# Patient Record
Sex: MALE | Race: BLACK OR AFRICAN AMERICAN | Employment: OTHER | URBAN - METROPOLITAN AREA
[De-identification: names, ages, dates, MRNs, and addresses within clinical notes are randomized per-mention and may not be internally consistent; named-entity substitution may affect disease eponyms.]

---

## 2019-11-22 ENCOUNTER — HOSPITAL ENCOUNTER (EMERGENCY)
Age: 69
Discharge: HOME | End: 2019-11-22
Attending: EMERGENCY MEDICINE
Payer: MEDICARE

## 2019-11-22 VITALS
OXYGEN SATURATION: 97 % | HEART RATE: 92 BPM | TEMPERATURE: 99 F | SYSTOLIC BLOOD PRESSURE: 129 MMHG | RESPIRATION RATE: 20 BRPM | DIASTOLIC BLOOD PRESSURE: 73 MMHG

## 2019-11-22 DIAGNOSIS — R09.81 NASAL CONGESTION: ICD-10-CM

## 2019-11-22 DIAGNOSIS — R09.82 POSTNASAL DRIP: Primary | ICD-10-CM

## 2019-11-22 PROCEDURE — 99282 EMERGENCY DEPT VISIT SF MDM: CPT

## 2019-11-22 RX ORDER — SILDENAFIL 100 MG/1
100 TABLET, FILM COATED ORAL AS NEEDED
COMMUNITY

## 2019-11-22 RX ORDER — AZELASTINE 1 MG/ML
1 SPRAY, METERED NASAL 2 TIMES DAILY
Qty: 1 BOTTLE | Refills: 0 | Status: SHIPPED | OUTPATIENT
Start: 2019-11-22

## 2019-11-22 RX ORDER — OMEPRAZOLE 20 MG/1
20 CAPSULE, DELAYED RELEASE ORAL DAILY
COMMUNITY

## 2019-11-22 RX ORDER — TAMSULOSIN HYDROCHLORIDE 0.4 MG/1
0.4 CAPSULE ORAL DAILY
COMMUNITY

## 2019-11-23 NOTE — ED PROVIDER NOTES
EMERGENCY DEPARTMENT HISTORY AND PHYSICAL EXAM    Date: 11/22/2019  Patient Name: Genaro Clemons    History of Presenting Illness     Chief Complaint   Patient presents with    Nasal Congestion    Cough       HPI: Genaro Clemons, 68 y.o. male presents to the ED c/o nasal congestion and postnasal drip been going on \"for a while\" that he attributes to using his CPAP machine, which causes him to cough in the morning. He denies fevers, chills, myalgias, fatigue. He is able to eat and drink normally. States he does not regularly use his CPAP machine because the face mask does not fit properly. States his wife is here being seen so he decided to be evaluated as well. There are no other complaints, changes, or physical findings at this time. Past History     Past Medical History:  Past Medical History:   Diagnosis Date    Diabetes ([facility] [address]     History of cholecystectomy     Hypertension     Schizoaffective disorder ([facility] [address]        Past Surgical History:  History reviewed. No pertinent surgical history. Family History:  Family History   Problem Relation Age of Onset    Stroke Father     Heart Disease Brother     Cancer Other     Diabetes Other     Hypertension Other        Social History:  Social History     Tobacco Use    Smoking status: Never Smoker   Substance Use Topics    Alcohol use: No    Drug use: No       Allergies: Allergies   Allergen Reactions    Lisinopril Runny Nose    Norco [Hydrocodone-Acetaminophen] Unknown (comments)    Sulfa (Sulfonamide Antibiotics) Other (comments)         Review of Systems   Constitutional: Negative for chills and fever. HENT: Positive for congestion and postnasal drip. Negative for ear discharge, ear pain, rhinorrhea, sore throat and trouble swallowing. Respiratory: Positive for cough. Negative for shortness of breath and wheezing. Cardiovascular: Negative for chest pain and palpitations.    Gastrointestinal: Negative for abdominal pain, nausea and vomiting. Musculoskeletal: Negative for back pain, myalgias and neck pain. Skin: Negative. Allergic/Immunologic: Negative. Neurological: Negative for headaches. Psychiatric/Behavioral: Negative. Physical Exam  Vitals signs and nursing note reviewed. Constitutional:       General: He is not in acute distress. Appearance: Normal appearance. He is well-developed. He is not toxic-appearing. HENT:      Head: Normocephalic and atraumatic. Right Ear: External ear normal.      Left Ear: External ear normal.      Nose: Congestion present. Right Turbinates: Enlarged. Left Turbinates: Enlarged. Right Sinus: No maxillary sinus tenderness or frontal sinus tenderness. Left Sinus: No maxillary sinus tenderness or frontal sinus tenderness. Mouth/Throat:      Mouth: Mucous membranes are moist.      Pharynx: Oropharynx is clear. Uvula midline. Eyes:      General: Lids are normal. No scleral icterus. Conjunctiva/sclera: Conjunctivae normal.   Neck:      Musculoskeletal: Normal range of motion and neck supple. Pulmonary:      Effort: Pulmonary effort is normal. No respiratory distress. Breath sounds: Normal breath sounds and air entry. Abdominal:      Tenderness: There is no tenderness. Musculoskeletal: Normal range of motion. Lymphadenopathy:      Cervical: No cervical adenopathy. Skin:     General: Skin is warm. Findings: No rash. Neurological:      Mental Status: He is alert and oriented to person, place, and time. Motor: No abnormal muscle tone. Psychiatric:         Speech: Speech normal.         Behavior: Behavior normal. Behavior is cooperative. Thought Content: Thought content normal.         Judgment: Judgment normal.          Diagnostic Study Results     Labs -   No results found for this or any previous visit (from the past 12 hour(s)).     Radiologic Studies -   No orders to display     CT Results  (Last 48 hours)    None CXR Results  (Last 48 hours)    None          Vital Signs-Reviewed the patient's vital signs. Patient Vitals for the past 12 hrs:   Temp Pulse Resp BP SpO2   11/1950 99 °F (37.2 °C) 92 20 129/73 97 %       I reviewed the vital signs, available nursing notes, past medical history, past surgical history, family history and social history. Provider Notes (Medical Decision Making):   Postnasal drip with throat irritation causing cough chronically. Throat normal appearance, pt afebrile, normal breath sounds. Treat with Astelin nasal spray. Encourage humidifier machine at home. Pt agrees with plan. Diagnosis     Clinical Impression:   1. Postnasal drip    2. Nasal congestion        Disposition:  Home    PLAN:  1. Current Discharge Medication List      START taking these medications    Details   azelastine (ASTELIN) 137 mcg (0.1 %) nasal spray 1 [place] by Both Nostrils route two (2) times a day. Use in each nostril as directed  Qty: 1 Bottle, Refills: 0           2. Follow-up Information     Follow up With Specialties Details Why [facility] EMERGENCY DEPT Emergency Medicine  If symptoms worsen, As needed [address] Julianne Palm MD Internal Medicine Schedule an appointment as soon as possible for a visit in 2 days for re-evaluation [address] [facility]  [phone]          3.  Return to ED if worse       Silke Archer PA-C

## 2021-08-10 ENCOUNTER — APPOINTMENT (OUTPATIENT)
Dept: GENERAL RADIOLOGY | Age: 71
End: 2021-08-10
Attending: PHYSICIAN ASSISTANT
Payer: MEDICARE

## 2021-08-10 ENCOUNTER — HOSPITAL ENCOUNTER (EMERGENCY)
Age: 71
Discharge: HOME | End: 2021-08-10
Attending: EMERGENCY MEDICINE
Payer: MEDICARE

## 2021-08-10 VITALS
HEART RATE: 85 BPM | BODY MASS INDEX: 30.1 KG/M2 | OXYGEN SATURATION: 96 % | DIASTOLIC BLOOD PRESSURE: 77 MMHG | HEIGHT: 71 IN | SYSTOLIC BLOOD PRESSURE: 137 MMHG | WEIGHT: 215 LBS | RESPIRATION RATE: 18 BRPM | TEMPERATURE: 98.6 F

## 2021-08-10 DIAGNOSIS — M19.011 GLENOHUMERAL ARTHRITIS, RIGHT: Primary | ICD-10-CM

## 2021-08-10 DIAGNOSIS — M75.81 BONE SPUR OF ACROMIOCLAVICULAR JOINT, RIGHT: ICD-10-CM

## 2021-08-10 DIAGNOSIS — B37.2 YEAST DERMATITIS: ICD-10-CM

## 2021-08-10 LAB — GLUCOSE BLD STRIP.AUTO-MCNC: 152 MG/DL (ref 70–110)

## 2021-08-10 PROCEDURE — 73030 X-RAY EXAM OF SHOULDER: CPT

## 2021-08-10 PROCEDURE — 99283 EMERGENCY DEPT VISIT LOW MDM: CPT

## 2021-08-10 PROCEDURE — 82962 GLUCOSE BLOOD TEST: CPT

## 2021-08-10 RX ORDER — DICLOFENAC SODIUM 10 MG/G
2 GEL TOPICAL 4 TIMES DAILY
Qty: 100 G | Refills: 0 | Status: SHIPPED | OUTPATIENT
Start: 2021-08-10

## 2021-08-10 RX ORDER — NYSTATIN 100000 [USP'U]/G
POWDER TOPICAL 4 TIMES DAILY
Qty: 30 G | Refills: 0 | Status: SHIPPED | OUTPATIENT
Start: 2021-08-10

## 2021-08-10 NOTE — ED TRIAGE NOTES
Pt reports skin rash under right axilla for months, states she has tried hydrocortisone and it has been unsuccessful. He also reports right shoulder pain while driving. Denies recent injury to shoulder.

## 2021-08-10 NOTE — ROUTINE PROCESS
Arron Weir is a 65 y.o. male that was discharged in stable. Pt was accompanied by self. Pt is not driving. The patients diagnosis, condition and treatment were explained to  patient and aftercare instructions were given. The patient verbalized understanding. Patient armband removed and shredded.

## 2021-09-14 NOTE — ED PROVIDER NOTES
EMERGENCY DEPARTMENT HISTORY AND PHYSICAL EXAM      Date: (Not on file)  Patient Name: Young Landau    History of Presenting Illness     Chief Complaint   Patient presents with    Skin Problem    Shoulder Pain       History Provided By: Patient    HPI: Young Landau, 66 y.o. male PMHx significant for DM, schizoaffective ds, htn presents ambulatory to the ED. Pt reports pruritic rash to right axilla x \"months\". Denies new lotion, soap, detergent. Denies history of eczema. Patient has been using hydrocortisone without relief of symptoms. Pt also reports right shoulder pain x \"months\". Patient reports symptoms are worse when driving. Denies numbness/tingling, radiating pain, weakness. Denies recent trauma or injury. Patient is been taking OTC medication without relief of symptoms. Patient has not followed up with Ortho. There are no other complaints, changes, or physical findings at this time. PCP: Nikunj, MD Juan    No current facility-administered medications on file prior to encounter. Current Outpatient Medications on File Prior to Encounter   Medication Sig Dispense Refill    tamsulosin (FLOMAX) 0.4 mg capsule Take 0.4 mg by mouth daily.  omeprazole (PRILOSEC) 20 mg capsule Take 20 mg by mouth daily.  sildenafil citrate (VIAGRA) 100 mg tablet Take 100 mg by mouth as needed.  acetaminophen (TYLENOL) 325 mg tablet Take 2 Tabs by mouth every four (4) hours as needed for Pain. 30 Tab 0    sertraline (ZOLOFT) 100 mg tablet Take 100 mg by mouth daily.  glipiZIDE (GLUCOTROL) 5 mg tablet Take 5 mg by mouth two (2) times a day.  aspirin 81 mg tablet Take 81 mg by mouth.  metFORMIN (GLUCOPHAGE) 500 mg tablet Take 500 mg by mouth two (2) times daily (with meals).  risperiDONE (RISPERDAL) 3 mg tablet Take 6 mg by mouth daily.  carBAMazepine XR (TEGRETOL XR) 400 mg SR tablet Take 800 mg by mouth two (2) times a day.       buPROPion SR (WELLBUTRIN SR) 150 mg SR tablet Take 150 mg by mouth two (2) times a day.  docusate sodium (COLACE) 100 mg capsule Take 200 mg by mouth daily.  amLODIPine (NORVASC) 10 mg tablet Take 5 mg by mouth daily.  atorvastatin (LIPITOR) 20 mg tablet Take 20 mg by mouth daily.  azelastine (ASTELIN) 137 mcg (0.1 %) nasal spray 1 [place] by Both Nostrils route two (2) times a day. Use in each nostril as directed (Patient not taking: Reported on 8/10/2021) 1 Bottle 0    oxybutynin (DITROPAN) 5 mg tablet Take 5 mg by mouth two (2) times a day. (Patient not taking: Reported on 8/10/2021)         Past History     Past Medical History:  Past Medical History:   Diagnosis Date    Diabetes ([facility] [address]     History of cholecystectomy     Hypertension     Schizoaffective disorder ([facility] [address]        Past Surgical History:  No past surgical history on file. Family History:  Family History   Problem Relation Age of Onset    Stroke Father     Heart Disease Brother     Cancer Other     Diabetes Other     Hypertension Other        Social History:  Social History     Tobacco Use    Smoking status: Never Smoker   Substance Use Topics    Alcohol use: No    Drug use: No       Allergies: Allergies   Allergen Reactions    Lisinopril Runny Nose    Norco [Hydrocodone-Acetaminophen] Unknown (comments)    Sulfa (Sulfonamide Antibiotics) Other (comments)         Review of Systems   Review of Systems   Constitutional: Negative for chills and fever. HENT: Negative for facial swelling. Eyes: Negative for photophobia and visual disturbance. Respiratory: Negative for shortness of breath. Cardiovascular: Negative for chest pain. Gastrointestinal: Negative for abdominal pain, nausea and vomiting. Genitourinary: Negative for flank pain. Musculoskeletal: Positive for arthralgias (right shoulder pain). Skin: Positive for rash. Negative for color change, pallor and wound.    Neurological: Negative for dizziness, weakness, light-headedness and headaches. All other systems reviewed and are negative. Physical Exam   Physical Exam  Vitals and nursing note reviewed. Constitutional:       General: He is not in acute distress. Appearance: He is well-developed. Comments: Pt in NAD   HENT:      Head: Normocephalic and atraumatic. Eyes:      Conjunctiva/sclera: Conjunctivae normal.   Cardiovascular:      Rate and Rhythm: Normal rate and regular rhythm. Heart sounds: Normal heart sounds. Pulmonary:      Effort: Pulmonary effort is normal. No respiratory distress. Breath sounds: Normal breath sounds. Abdominal:      General: Bowel sounds are normal. There is no distension. Palpations: Abdomen is soft. Musculoskeletal:         General: Normal range of motion. Right shoulder: Tenderness and bony tenderness present. Normal range of motion (full AROM intact). Comments: Radial pulses strong and equal bilaterally  Sensation equal and intact to lower extremities bilaterally  Strength 5/5 to lower extremities bilaterally   Skin:     General: Skin is warm. Findings: No rash. Comments: Right axilla: Erythematous macular rash. No papules or vesicles. No induration, fluctuance, drainage or warmth   Neurological:      Mental Status: He is alert and oriented to person, place, and time. Psychiatric:         Behavior: Behavior normal.         Diagnostic Study Results     Labs -   No results found for this or any previous visit (from the past 12 hour(s)). Radiologic Studies -   XR SHOULDER RT AP/LAT MIN 2 V   Final Result   1. Mild glenohumeral osteoarthritis. 2.  Small subacromial spur. CT Results  (Last 48 hours)    None        CXR Results  (Last 48 hours)    None          Medical Decision Making   I am the first provider for this patient. I reviewed the vital signs, available nursing notes, past medical history, past surgical history, family history and social history.     Vital Signs-Reviewed the patient's vital signs. No data found. Records Reviewed: Nursing Notes and Old Medical Records    Provider Notes (Medical Decision Making):   DDx: Shoulder fracture vs arthritis vs sprain, Yeast dermatitis, Dermatitis, Tinea corporis    67 yo M who presents with rash to right axilla x \"months\" and right shoulder pain x \"months\". On exam, erythematous macular rash. Appears to be yeast dermatitis. Will treat with nystatin. Also right shoulder tenderness. NVI. No overlying erythema or warmth. Xray show arthritis and glenohumeral spur. Will treat patient symptomatically and discussed need for Ortho follow-up. At time of discharge, pt non-toxic appearing in NAD. Pt stable for prompt outpatient follow-up with PCP 1 to 2 days. Patient given strict instructions to return if symptoms worsen. ED Course:   Initial assessment performed. The patients presenting problems have been discussed, and they are in agreement with the care plan formulated and outlined with them. I have encouraged them to ask questions as they arise throughout their visit. Disposition:  Discussed imaging results with pt along with dx and treatment plan. Discussed importance of PCP follow up. All questions answered. Pt voiced they understood. Return if sx worsen. PLAN:  1. Discharge Medication List as of 8/10/2021  2:53 PM      START taking these medications    Details   diclofenac (Voltaren Arthritis Pain) 1 % gel Apply 2 g to affected area four (4) times daily. , Normal, Disp-100 g, R-0      nystatin (MYCOSTATIN) powder Apply  to affected area four (4) times daily. , Normal, Disp-30 g, R-0         CONTINUE these medications which have NOT CHANGED    Details   tamsulosin (FLOMAX) 0.4 mg capsule Take 0.4 mg by mouth daily. , Historical Med      omeprazole (PRILOSEC) 20 mg capsule Take 20 mg by mouth daily. , Historical Med      sildenafil citrate (VIAGRA) 100 mg tablet Take 100 mg by mouth as needed., Historical Med acetaminophen (TYLENOL) 325 mg tablet Take 2 Tabs by mouth every four (4) hours as needed for Pain., Print, Disp-30 Tab, R-0      sertraline (ZOLOFT) 100 mg tablet Take 100 mg by mouth daily. , Historical Med      glipiZIDE (GLUCOTROL) 5 mg tablet Take 5 mg by mouth two (2) times a day., Historical Med      aspirin 81 mg tablet Take 81 mg by mouth., Historical Med      metFORMIN (GLUCOPHAGE) 500 mg tablet Take 500 mg by mouth two (2) times daily (with meals). , Historical Med      risperiDONE (RISPERDAL) 3 mg tablet Take 6 mg by mouth daily. , Historical Med      carBAMazepine XR (TEGRETOL XR) 400 mg SR tablet Take 800 mg by mouth two (2) times a day., Historical Med      buPROPion SR (WELLBUTRIN SR) 150 mg SR tablet Take 150 mg by mouth two (2) times a day., Historical Med      docusate sodium (COLACE) 100 mg capsule Take 200 mg by mouth daily. , Historical Med      amLODIPine (NORVASC) 10 mg tablet Take 5 mg by mouth daily. , Historical Med      atorvastatin (LIPITOR) 20 mg tablet Take 20 mg by mouth daily. , Historical Med      azelastine (ASTELIN) 137 mcg (0.1 %) nasal spray 1 [place] by Both Nostrils route two (2) times a day. Use in each nostril as directed, Print, Disp-1 Bottle, R-0      oxybutynin (DITROPAN) 5 mg tablet Take 5 mg by mouth two (2) times a day., Historical Med           2. Follow-up Information     Follow up With Specialties Details Why [address]  Schedule an appointment as soon as possible for a visit in 1 day  [address]  Schedule an appointment as soon as possible for a visit in 1 day  [address] [address] [place] [id number]  [phone] [address] [facility] EMERGENCY DEPT Emergency Medicine  As needed, If symptoms worsen [address] [facility]  [phone]        Return to ED if worse     Diagnosis     Clinical Impression:   1.  Glenohumeral arthritis, right    2. Bone spur of acromioclavicular joint, right    3. Yeast dermatitis        Attestations:    DAVID Zhu    Please note that this dictation was completed with [employer], the computer voice recognition software. Quite often unanticipated grammatical, syntax, homophones, and other interpretive errors are inadvertently transcribed by the computer software. Please disregard these errors. Please excuse any errors that have escaped final proofreading. Thank you.

## 2023-09-21 ENCOUNTER — HOSPITAL ENCOUNTER (EMERGENCY)
Facility: HOSPITAL | Age: 73
Discharge: HOME | End: 2023-09-21
Payer: MEDICARE

## 2023-09-21 ENCOUNTER — APPOINTMENT (OUTPATIENT)
Facility: HOSPITAL | Age: 73
End: 2023-09-21
Payer: MEDICARE

## 2023-09-21 VITALS
RESPIRATION RATE: 16 BRPM | BODY MASS INDEX: 30.06 KG/M2 | HEART RATE: 78 BPM | TEMPERATURE: 97.1 F | DIASTOLIC BLOOD PRESSURE: 66 MMHG | HEIGHT: 70 IN | OXYGEN SATURATION: 97 % | WEIGHT: 210 LBS | SYSTOLIC BLOOD PRESSURE: 138 MMHG
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
## 2023-09-21 DIAGNOSIS — S86.911A STRAIN OF RIGHT KNEE, INITIAL ENCOUNTER: ICD-10-CM

## 2023-09-21 PROCEDURE — 73562 X-RAY EXAM OF KNEE 3: CPT

## 2023-09-21 PROCEDURE — 99283 EMERGENCY DEPT VISIT LOW MDM: CPT

## 2023-09-21 RX ORDER — NAPROXEN 500 MG/1
500 TABLET ORAL 2 TIMES DAILY PRN
Qty: 30 TABLET | Refills: 0 | Status: SHIPPED | OUTPATIENT
Start: 2023-09-21

## 2023-09-21 ASSESSMENT — PAIN DESCRIPTION - ORIENTATION: ORIENTATION: RIGHT

## 2023-09-21 ASSESSMENT — PAIN SCALES - GENERAL: PAINLEVEL_OUTOF10: 8

## 2023-09-21 ASSESSMENT — LIFESTYLE VARIABLES: HOW OFTEN DO YOU HAVE A DRINK CONTAINING ALCOHOL: NEVER

## 2023-09-21 ASSESSMENT — PAIN - FUNCTIONAL ASSESSMENT: PAIN_FUNCTIONAL_ASSESSMENT: 0-10

## 2023-09-21 ASSESSMENT — PAIN DESCRIPTION - LOCATION: LOCATION: KNEE

## 2023-09-21 NOTE — ED PROVIDER NOTES
EMERGENCY DEPARTMENT HISTORY AND PHYSICAL EXAM    5:49 PM      Date: 9/21/2023  Patient Name: Merritt Cortez    History of Presenting Illness     Chief Complaint   Patient presents with    Fall         History Provided By: Patient and medical chart review. Additional History (Context): Merritt Cortez is a 74 y.o. male with   Past Medical History:   Diagnosis Date    Diabetes ([address])     History of cholecystectomy     Hypertension     Schizoaffective disorder ([address])    who presents with complaints of right knee pain status post fall. States he had a fall around 7 AM this morning states he felt like his right knee kind of gave out and hit his right knee on the floor. Denies any head injury, no loss of consciousness. Complaining of right knee pain 7 out of 10. States he took some Tylenol and Motrin unsure of its helped yet. Patient denies any chest pain shortness of breath. Afebrile. PCP: PROVIDER UNKNOWN    No current facility-administered medications for this encounter.      Current Outpatient Medications   Medication Sig Dispense Refill    naproxen (NAPROSYN) 500 MG tablet Take 1 tablet by mouth 2 times daily as needed for Pain 30 tablet 0    acetaminophen (TYLENOL) 325 MG tablet Take 650 mg by mouth every 4 hours as needed      amLODIPine (NORVASC) 10 MG tablet Take 5 mg by mouth daily      atorvastatin (LIPITOR) 20 MG tablet Take 20 mg by mouth daily      azelastine (ASTELIN) 0.1 % nasal spray 1 spray by Nasal route 2 times daily      buPROPion (WELLBUTRIN SR) 150 MG extended release tablet Take 150 mg by mouth 2 times daily      carBAMazepine (TEGRETOL XR) 400 MG extended release tablet Take 800 mg by mouth 2 times daily      diclofenac sodium (VOLTAREN) 1 % GEL Apply 2 g topically 4 times daily      docusate (COLACE, DULCOLAX) 100 MG CAPS Take 200 mg by mouth daily      glipiZIDE (GLUCOTROL) 5 MG tablet Take 5 mg by mouth 2 times daily      metFORMIN (GLUCOPHAGE) 500 MG tablet Take 500 mg by mouth 2

## 2023-09-21 NOTE — ED TRIAGE NOTES
Pt to ED for eval of right knee pain s/p GLF after his knee gave out this AM at 0700 today. Pain radiates to right calf.

## 2023-09-21 NOTE — DISCHARGE INSTRUCTIONS
Take medication as prescribed. Follow-up with your primary care physician within 7 days for reassessment. Bring the results from this visit with you for their review. Return to the ED immediately for any new, worsening, or persistent symptoms, including fever, vomiting, or any other medical concerns.

## 2023-09-29 ENCOUNTER — APPOINTMENT (OUTPATIENT)
Facility: HOSPITAL | Age: 73
End: 2023-09-29
Payer: MEDICARE

## 2023-09-29 ENCOUNTER — HOSPITAL ENCOUNTER (OUTPATIENT)
Facility: HOSPITAL | Age: 73
Setting detail: OBSERVATION
Discharge: HOME HEALTH | End: 2023-10-01
Attending: EMERGENCY MEDICINE | Admitting: INTERNAL MEDICINE
Payer: MEDICARE

## 2023-09-29 DIAGNOSIS — E11.69 TYPE 2 DIABETES MELLITUS WITH OTHER SPECIFIED COMPLICATION, WITHOUT LONG-TERM CURRENT USE OF INSULIN (HCC): ICD-10-CM

## 2023-09-29 DIAGNOSIS — G45.9 TIA (TRANSIENT ISCHEMIC ATTACK): Primary | ICD-10-CM

## 2023-09-29 LAB
ALBUMIN SERPL-MCNC: 3.6 G/DL (ref 3.4–5)
ALBUMIN/GLOB SERPL: 0.9 (ref 0.8–1.7)
ALP SERPL-CCNC: 71 U/L (ref 45–117)
ALT SERPL-CCNC: 36 U/L (ref 16–61)
ANION GAP SERPL CALC-SCNC: 8 MMOL/L (ref 3–18)
APPEARANCE UR: CLEAR
AST SERPL-CCNC: 27 U/L (ref 10–38)
BACTERIA URNS QL MICRO: NEGATIVE /HPF
BASOPHILS # BLD: 0 K/UL (ref 0–0.1)
BASOPHILS NFR BLD: 0 % (ref 0–2)
BILIRUB SERPL-MCNC: 0.3 MG/DL (ref 0.2–1)
BILIRUB UR QL: NEGATIVE
BUN SERPL-MCNC: 6 MG/DL (ref 7–18)
BUN/CREAT SERPL: 6 (ref 12–20)
CALCIUM SERPL-MCNC: 8.9 MG/DL (ref 8.5–10.1)
CHLORIDE SERPL-SCNC: 106 MMOL/L (ref 100–111)
CO2 SERPL-SCNC: 23 MMOL/L (ref 21–32)
COLOR UR: YELLOW
CREAT SERPL-MCNC: 0.94 MG/DL (ref 0.6–1.3)
DIFFERENTIAL METHOD BLD: ABNORMAL
EOSINOPHIL # BLD: 0.1 K/UL (ref 0–0.4)
EOSINOPHIL NFR BLD: 1 % (ref 0–5)
EPITH CASTS URNS QL MICRO: NORMAL /LPF (ref 0–5)
ERYTHROCYTE [DISTWIDTH] IN BLOOD BY AUTOMATED COUNT: 12.1 % (ref 11.6–14.5)
GLOBULIN SER CALC-MCNC: 3.9 G/DL (ref 2–4)
GLUCOSE BLD STRIP.AUTO-MCNC: 115 MG/DL (ref 70–110)
GLUCOSE BLD STRIP.AUTO-MCNC: 274 MG/DL (ref 70–110)
GLUCOSE SERPL-MCNC: 296 MG/DL (ref 74–99)
GLUCOSE UR STRIP.AUTO-MCNC: 500 MG/DL
HCT VFR BLD AUTO: 40.9 % (ref 36–48)
HGB BLD-MCNC: 14.7 G/DL (ref 13–16)
HGB UR QL STRIP: ABNORMAL
IMM GRANULOCYTES # BLD AUTO: 0 K/UL (ref 0–0.04)
IMM GRANULOCYTES NFR BLD AUTO: 0 % (ref 0–0.5)
INR PPP: 1 (ref 0.9–1.1)
KETONES UR QL STRIP.AUTO: NEGATIVE MG/DL
LEUKOCYTE ESTERASE UR QL STRIP.AUTO: NEGATIVE
LYMPHOCYTES # BLD: 1.6 K/UL (ref 0.9–3.6)
LYMPHOCYTES NFR BLD: 18 % (ref 21–52)
MCH RBC QN AUTO: 29.6 PG (ref 24–34)
MCHC RBC AUTO-ENTMCNC: 35.9 G/DL (ref 31–37)
MCV RBC AUTO: 82.3 FL (ref 78–100)
MONOCYTES # BLD: 0.8 K/UL (ref 0.05–1.2)
MONOCYTES NFR BLD: 9 % (ref 3–10)
NEUTS SEG # BLD: 6.1 K/UL (ref 1.8–8)
NEUTS SEG NFR BLD: 71 % (ref 40–73)
NITRITE UR QL STRIP.AUTO: NEGATIVE
NRBC # BLD: 0 K/UL (ref 0–0.01)
NRBC BLD-RTO: 0 PER 100 WBC
PH UR STRIP: 7.5 (ref 5–8)
PLATELET # BLD AUTO: 241 K/UL (ref 135–420)
PMV BLD AUTO: 9.2 FL (ref 9.2–11.8)
POTASSIUM SERPL-SCNC: 3.6 MMOL/L (ref 3.5–5.5)
PROT SERPL-MCNC: 7.5 G/DL (ref 6.4–8.2)
PROT UR STRIP-MCNC: NEGATIVE MG/DL
PROTHROMBIN TIME: 13.6 SEC (ref 11.9–14.7)
RBC # BLD AUTO: 4.97 M/UL (ref 4.35–5.65)
RBC #/AREA URNS HPF: NORMAL /HPF (ref 0–5)
SODIUM SERPL-SCNC: 137 MMOL/L (ref 136–145)
SP GR UR REFRACTOMETRY: 1.01 (ref 1–1.03)
TROPONIN I SERPL HS-MCNC: 7 NG/L (ref 0–78)
UROBILINOGEN UR QL STRIP.AUTO: 0.2 EU/DL (ref 0.2–1)
WBC # BLD AUTO: 8.6 K/UL (ref 4.6–13.2)
WBC URNS QL MICRO: NORMAL /HPF (ref 0–4)

## 2023-09-29 PROCEDURE — 96375 TX/PRO/DX INJ NEW DRUG ADDON: CPT

## 2023-09-29 PROCEDURE — 96365 THER/PROPH/DIAG IV INF INIT: CPT

## 2023-09-29 PROCEDURE — 6370000000 HC RX 637 (ALT 250 FOR IP): Performed by: EMERGENCY MEDICINE

## 2023-09-29 PROCEDURE — 85610 PROTHROMBIN TIME: CPT

## 2023-09-29 PROCEDURE — 83036 HEMOGLOBIN GLYCOSYLATED A1C: CPT

## 2023-09-29 PROCEDURE — 93005 ELECTROCARDIOGRAM TRACING: CPT | Performed by: EMERGENCY MEDICINE

## 2023-09-29 PROCEDURE — 80061 LIPID PANEL: CPT

## 2023-09-29 PROCEDURE — 6360000002 HC RX W HCPCS: Performed by: EMERGENCY MEDICINE

## 2023-09-29 PROCEDURE — 85025 COMPLETE CBC W/AUTO DIFF WBC: CPT

## 2023-09-29 PROCEDURE — 70450 CT HEAD/BRAIN W/O DYE: CPT

## 2023-09-29 PROCEDURE — 80053 COMPREHEN METABOLIC PANEL: CPT

## 2023-09-29 PROCEDURE — 71045 X-RAY EXAM CHEST 1 VIEW: CPT

## 2023-09-29 PROCEDURE — 99285 EMERGENCY DEPT VISIT HI MDM: CPT

## 2023-09-29 PROCEDURE — 84484 ASSAY OF TROPONIN QUANT: CPT

## 2023-09-29 PROCEDURE — 81001 URINALYSIS AUTO W/SCOPE: CPT

## 2023-09-29 PROCEDURE — 82962 GLUCOSE BLOOD TEST: CPT

## 2023-09-29 RX ORDER — LEVOFLOXACIN 5 MG/ML
500 INJECTION, SOLUTION INTRAVENOUS
Status: COMPLETED | OUTPATIENT
Start: 2023-09-29 | End: 2023-09-30

## 2023-09-29 RX ADMIN — INSULIN HUMAN 5 UNITS: 100 INJECTION, SOLUTION PARENTERAL at 22:40

## 2023-09-29 RX ADMIN — LEVOFLOXACIN 500 MG: 500 INJECTION, SOLUTION INTRAVENOUS at 23:31

## 2023-09-29 ASSESSMENT — PAIN - FUNCTIONAL ASSESSMENT: PAIN_FUNCTIONAL_ASSESSMENT: 0-10

## 2023-09-29 ASSESSMENT — PAIN SCALES - GENERAL: PAINLEVEL_OUTOF10: 5

## 2023-09-29 ASSESSMENT — PAIN DESCRIPTION - ORIENTATION: ORIENTATION: RIGHT;LOWER

## 2023-09-29 ASSESSMENT — ENCOUNTER SYMPTOMS
RESPIRATORY NEGATIVE: 1
GASTROINTESTINAL NEGATIVE: 1

## 2023-09-29 ASSESSMENT — PAIN DESCRIPTION - LOCATION: LOCATION: LEG

## 2023-09-30 ENCOUNTER — APPOINTMENT (OUTPATIENT)
Facility: HOSPITAL | Age: 73
End: 2023-09-30
Payer: MEDICARE

## 2023-09-30 ENCOUNTER — APPOINTMENT (OUTPATIENT)
Facility: HOSPITAL | Age: 73
End: 2023-09-30
Attending: INTERNAL MEDICINE
Payer: MEDICARE

## 2023-09-30 PROBLEM — E11.9 TYPE 2 DIABETES MELLITUS (HCC): Status: ACTIVE | Noted: 2023-09-30

## 2023-09-30 PROBLEM — I10 HYPERTENSION: Status: ACTIVE | Noted: 2023-09-30

## 2023-09-30 PROBLEM — R00.0 TACHYCARDIA: Status: ACTIVE | Noted: 2023-09-30

## 2023-09-30 PROBLEM — G45.9 TIA (TRANSIENT ISCHEMIC ATTACK): Chronic | Status: ACTIVE | Noted: 2023-09-30

## 2023-09-30 PROBLEM — G45.9 TIA (TRANSIENT ISCHEMIC ATTACK): Status: ACTIVE | Noted: 2023-09-30

## 2023-09-30 PROBLEM — R81 GLUCOSURIA: Status: ACTIVE | Noted: 2023-09-30

## 2023-09-30 LAB
ALBUMIN SERPL-MCNC: 3.4 G/DL (ref 3.4–5)
ALBUMIN/GLOB SERPL: 0.9 (ref 0.8–1.7)
ALP SERPL-CCNC: 69 U/L (ref 45–117)
ALT SERPL-CCNC: 35 U/L (ref 16–61)
ANION GAP SERPL CALC-SCNC: 5 MMOL/L (ref 3–18)
AST SERPL-CCNC: 24 U/L (ref 10–38)
BASOPHILS # BLD: 0 K/UL (ref 0–0.1)
BASOPHILS NFR BLD: 1 % (ref 0–2)
BILIRUB SERPL-MCNC: 0.2 MG/DL (ref 0.2–1)
BUN SERPL-MCNC: 10 MG/DL (ref 7–18)
BUN/CREAT SERPL: 12 (ref 12–20)
CALCIUM SERPL-MCNC: 9 MG/DL (ref 8.5–10.1)
CHLORIDE SERPL-SCNC: 107 MMOL/L (ref 100–111)
CHOLEST SERPL-MCNC: 166 MG/DL
CO2 SERPL-SCNC: 25 MMOL/L (ref 21–32)
CREAT SERPL-MCNC: 0.84 MG/DL (ref 0.6–1.3)
DIFFERENTIAL METHOD BLD: ABNORMAL
ECHO AO ROOT DIAM: 3.4 CM
ECHO AO ROOT INDEX: 1.57 CM/M2
ECHO AR MAX VEL PISA: 3.4 M/S
ECHO AV REGURGITANT PHT: 686.9 MILLISECOND
ECHO BSA: 2.21 M2
ECHO LA VOL 2C: 41 ML (ref 18–58)
ECHO LA VOL 4C: 42 ML (ref 18–58)
ECHO LA VOL BP: 39 ML (ref 18–58)
ECHO LA VOL/BSA BIPLANE: 18 ML/M2 (ref 16–34)
ECHO LA VOLUME AREA LENGTH: 43 ML
ECHO LA VOLUME INDEX A2C: 19 ML/M2 (ref 16–34)
ECHO LA VOLUME INDEX A4C: 19 ML/M2 (ref 16–34)
ECHO LA VOLUME INDEX AREA LENGTH: 20 ML/M2 (ref 16–34)
ECHO LV E' LATERAL VELOCITY: 11 CM/S
ECHO LV E' SEPTAL VELOCITY: 7 CM/S
ECHO LVOT AREA: 3.8 CM2
ECHO LVOT DIAM: 2.2 CM
ECHO LVOT MEAN GRADIENT: 2 MMHG
ECHO LVOT PEAK GRADIENT: 5 MMHG
ECHO LVOT PEAK VELOCITY: 1.1 M/S
ECHO LVOT STROKE VOLUME INDEX: 42.2 ML/M2
ECHO LVOT SV: 91.6 ML
ECHO LVOT VTI: 24.1 CM
ECHO MV A VELOCITY: 0.75 M/S
ECHO MV E DECELERATION TIME (DT): 236 MS
ECHO MV E VELOCITY: 0.57 M/S
ECHO MV E/A RATIO: 0.76
ECHO MV E/E' LATERAL: 5.18
ECHO MV E/E' RATIO (AVERAGED): 6.66
ECHO MV E/E' SEPTAL: 8.14
ECHO RV BASAL DIMENSION: 3.6 CM
ECHO RV FREE WALL PEAK S': 11 CM/S
ECHO RV TAPSE: 1.9 CM (ref 1.7–?)
ECHO TV REGURGITANT MAX VELOCITY: 2.38 M/S
ECHO TV REGURGITANT PEAK GRADIENT: 23 MMHG
EKG ATRIAL RATE: 72 BPM
EKG DIAGNOSIS: NORMAL
EKG P AXIS: 54 DEGREES
EKG P-R INTERVAL: 196 MS
EKG Q-T INTERVAL: 382 MS
EKG QRS DURATION: 84 MS
EKG QTC CALCULATION (BAZETT): 418 MS
EKG R AXIS: -25 DEGREES
EKG T AXIS: 32 DEGREES
EKG VENTRICULAR RATE: 72 BPM
EOSINOPHIL # BLD: 0.2 K/UL (ref 0–0.4)
EOSINOPHIL NFR BLD: 2 % (ref 0–5)
ERYTHROCYTE [DISTWIDTH] IN BLOOD BY AUTOMATED COUNT: 12.4 % (ref 11.6–14.5)
EST. AVERAGE GLUCOSE BLD GHB EST-MCNC: 134 MG/DL
FOLATE SERPL-MCNC: 19.7 NG/ML (ref 3.1–17.5)
GLOBULIN SER CALC-MCNC: 4 G/DL (ref 2–4)
GLUCOSE BLD STRIP.AUTO-MCNC: 111 MG/DL (ref 70–110)
GLUCOSE BLD STRIP.AUTO-MCNC: 137 MG/DL (ref 70–110)
GLUCOSE BLD STRIP.AUTO-MCNC: 150 MG/DL (ref 70–110)
GLUCOSE BLD STRIP.AUTO-MCNC: 225 MG/DL (ref 70–110)
GLUCOSE SERPL-MCNC: 232 MG/DL (ref 74–99)
HBA1C MFR BLD: 6.3 % (ref 4.2–5.6)
HCT VFR BLD AUTO: 41.3 % (ref 36–48)
HDLC SERPL-MCNC: 62 MG/DL (ref 40–60)
HDLC SERPL: 2.7 (ref 0–5)
HGB BLD-MCNC: 14.6 G/DL (ref 13–16)
IMM GRANULOCYTES # BLD AUTO: 0 K/UL (ref 0–0.04)
IMM GRANULOCYTES NFR BLD AUTO: 0 % (ref 0–0.5)
LACTATE SERPL-SCNC: 1.6 MMOL/L (ref 0.4–2)
LDLC SERPL CALC-MCNC: 83.8 MG/DL (ref 0–100)
LIPID PANEL: ABNORMAL
LYMPHOCYTES # BLD: 1.3 K/UL (ref 0.9–3.6)
LYMPHOCYTES NFR BLD: 16 % (ref 21–52)
MCH RBC QN AUTO: 29.6 PG (ref 24–34)
MCHC RBC AUTO-ENTMCNC: 35.4 G/DL (ref 31–37)
MCV RBC AUTO: 83.6 FL (ref 78–100)
MONOCYTES # BLD: 0.9 K/UL (ref 0.05–1.2)
MONOCYTES NFR BLD: 11 % (ref 3–10)
NEUTS SEG # BLD: 5.9 K/UL (ref 1.8–8)
NEUTS SEG NFR BLD: 71 % (ref 40–73)
NRBC # BLD: 0 K/UL (ref 0–0.01)
NRBC BLD-RTO: 0 PER 100 WBC
PLATELET # BLD AUTO: 213 K/UL (ref 135–420)
PMV BLD AUTO: 9.2 FL (ref 9.2–11.8)
POTASSIUM SERPL-SCNC: 3.7 MMOL/L (ref 3.5–5.5)
PROLACTIN SERPL-MCNC: 10.8 NG/ML
PROT SERPL-MCNC: 7.4 G/DL (ref 6.4–8.2)
RBC # BLD AUTO: 4.94 M/UL (ref 4.35–5.65)
SODIUM SERPL-SCNC: 137 MMOL/L (ref 136–145)
TRIGL SERPL-MCNC: 101 MG/DL
TROPONIN I SERPL HS-MCNC: 7 NG/L (ref 0–78)
TSH SERPL DL<=0.05 MIU/L-ACNC: 1.5 UIU/ML (ref 0.36–3.74)
VIT B12 SERPL-MCNC: 560 PG/ML (ref 211–911)
VLDLC SERPL CALC-MCNC: 20.2 MG/DL
WBC # BLD AUTO: 8.4 K/UL (ref 4.6–13.2)

## 2023-09-30 PROCEDURE — G0378 HOSPITAL OBSERVATION PER HR: HCPCS

## 2023-09-30 PROCEDURE — 2580000003 HC RX 258: Performed by: INTERNAL MEDICINE

## 2023-09-30 PROCEDURE — 82962 GLUCOSE BLOOD TEST: CPT

## 2023-09-30 PROCEDURE — 84146 ASSAY OF PROLACTIN: CPT

## 2023-09-30 PROCEDURE — 99222 1ST HOSP IP/OBS MODERATE 55: CPT | Performed by: INTERNAL MEDICINE

## 2023-09-30 PROCEDURE — 93010 ELECTROCARDIOGRAM REPORT: CPT | Performed by: INTERNAL MEDICINE

## 2023-09-30 PROCEDURE — 93306 TTE W/DOPPLER COMPLETE: CPT

## 2023-09-30 PROCEDURE — 84484 ASSAY OF TROPONIN QUANT: CPT

## 2023-09-30 PROCEDURE — 83605 ASSAY OF LACTIC ACID: CPT

## 2023-09-30 PROCEDURE — 85025 COMPLETE CBC W/AUTO DIFF WBC: CPT

## 2023-09-30 PROCEDURE — 6370000000 HC RX 637 (ALT 250 FOR IP): Performed by: STUDENT IN AN ORGANIZED HEALTH CARE EDUCATION/TRAINING PROGRAM

## 2023-09-30 PROCEDURE — 2500000003 HC RX 250 WO HCPCS: Performed by: STUDENT IN AN ORGANIZED HEALTH CARE EDUCATION/TRAINING PROGRAM

## 2023-09-30 PROCEDURE — 6360000002 HC RX W HCPCS: Performed by: INTERNAL MEDICINE

## 2023-09-30 PROCEDURE — 96372 THER/PROPH/DIAG INJ SC/IM: CPT

## 2023-09-30 PROCEDURE — 6370000000 HC RX 637 (ALT 250 FOR IP): Performed by: EMERGENCY MEDICINE

## 2023-09-30 PROCEDURE — 80053 COMPREHEN METABOLIC PANEL: CPT

## 2023-09-30 PROCEDURE — 93306 TTE W/DOPPLER COMPLETE: CPT | Performed by: INTERNAL MEDICINE

## 2023-09-30 PROCEDURE — 84443 ASSAY THYROID STIM HORMONE: CPT

## 2023-09-30 PROCEDURE — 36415 COLL VENOUS BLD VENIPUNCTURE: CPT

## 2023-09-30 PROCEDURE — 82607 VITAMIN B-12: CPT

## 2023-09-30 PROCEDURE — 82746 ASSAY OF FOLIC ACID SERUM: CPT

## 2023-09-30 PROCEDURE — 6370000000 HC RX 637 (ALT 250 FOR IP): Performed by: INTERNAL MEDICINE

## 2023-09-30 RX ORDER — SODIUM CHLORIDE 9 MG/ML
INJECTION, SOLUTION INTRAVENOUS PRN
Status: DISCONTINUED | OUTPATIENT
Start: 2023-09-30 | End: 2023-10-01 | Stop reason: HOSPADM

## 2023-09-30 RX ORDER — ENOXAPARIN SODIUM 100 MG/ML
40 INJECTION SUBCUTANEOUS DAILY
Status: DISCONTINUED | OUTPATIENT
Start: 2023-09-30 | End: 2023-10-01 | Stop reason: HOSPADM

## 2023-09-30 RX ORDER — TAMSULOSIN HYDROCHLORIDE 0.4 MG/1
0.4 CAPSULE ORAL DAILY
Status: DISCONTINUED | OUTPATIENT
Start: 2023-09-30 | End: 2023-10-01 | Stop reason: HOSPADM

## 2023-09-30 RX ORDER — INSULIN LISPRO 100 [IU]/ML
3 INJECTION, SOLUTION INTRAVENOUS; SUBCUTANEOUS
Status: DISCONTINUED | OUTPATIENT
Start: 2023-09-30 | End: 2023-10-01 | Stop reason: HOSPADM

## 2023-09-30 RX ORDER — ATORVASTATIN CALCIUM 20 MG/1
20 TABLET, FILM COATED ORAL NIGHTLY
Status: DISCONTINUED | OUTPATIENT
Start: 2023-09-30 | End: 2023-10-01 | Stop reason: HOSPADM

## 2023-09-30 RX ORDER — SODIUM CHLORIDE 0.9 % (FLUSH) 0.9 %
5-40 SYRINGE (ML) INJECTION EVERY 12 HOURS SCHEDULED
Status: DISCONTINUED | OUTPATIENT
Start: 2023-09-30 | End: 2023-10-01 | Stop reason: HOSPADM

## 2023-09-30 RX ORDER — ONDANSETRON 4 MG/1
4 TABLET, ORALLY DISINTEGRATING ORAL EVERY 8 HOURS PRN
Status: CANCELLED | OUTPATIENT
Start: 2023-09-30

## 2023-09-30 RX ORDER — SODIUM CHLORIDE 0.9 % (FLUSH) 0.9 %
5-40 SYRINGE (ML) INJECTION PRN
Status: DISCONTINUED | OUTPATIENT
Start: 2023-09-30 | End: 2023-10-01 | Stop reason: HOSPADM

## 2023-09-30 RX ORDER — SODIUM CHLORIDE 0.9 % (FLUSH) 0.9 %
5-40 SYRINGE (ML) INJECTION EVERY 12 HOURS SCHEDULED
Status: CANCELLED | OUTPATIENT
Start: 2023-09-30

## 2023-09-30 RX ORDER — ASPIRIN 81 MG/1
81 TABLET, CHEWABLE ORAL DAILY
Status: DISCONTINUED | OUTPATIENT
Start: 2023-09-30 | End: 2023-10-01 | Stop reason: HOSPADM

## 2023-09-30 RX ORDER — INSULIN GLARGINE 100 [IU]/ML
10 INJECTION, SOLUTION SUBCUTANEOUS NIGHTLY
Status: DISCONTINUED | OUTPATIENT
Start: 2023-09-30 | End: 2023-10-01 | Stop reason: HOSPADM

## 2023-09-30 RX ORDER — ACETAMINOPHEN 325 MG/1
650 TABLET ORAL EVERY 6 HOURS PRN
Status: CANCELLED | OUTPATIENT
Start: 2023-09-30

## 2023-09-30 RX ORDER — SODIUM CHLORIDE 0.9 % (FLUSH) 0.9 %
5-40 SYRINGE (ML) INJECTION PRN
Status: CANCELLED | OUTPATIENT
Start: 2023-09-30

## 2023-09-30 RX ORDER — BUPROPION HYDROCHLORIDE 150 MG/1
150 TABLET, EXTENDED RELEASE ORAL 2 TIMES DAILY
Status: DISCONTINUED | OUTPATIENT
Start: 2023-09-30 | End: 2023-09-30 | Stop reason: SDUPTHER

## 2023-09-30 RX ORDER — INSULIN LISPRO 100 [IU]/ML
0-4 INJECTION, SOLUTION INTRAVENOUS; SUBCUTANEOUS
Status: DISCONTINUED | OUTPATIENT
Start: 2023-09-30 | End: 2023-10-01 | Stop reason: HOSPADM

## 2023-09-30 RX ORDER — RISPERIDONE 2 MG/1
6 TABLET ORAL DAILY
Status: DISCONTINUED | OUTPATIENT
Start: 2023-09-30 | End: 2023-10-01 | Stop reason: HOSPADM

## 2023-09-30 RX ORDER — ONDANSETRON 2 MG/ML
4 INJECTION INTRAMUSCULAR; INTRAVENOUS EVERY 6 HOURS PRN
Status: CANCELLED | OUTPATIENT
Start: 2023-09-30

## 2023-09-30 RX ORDER — PANTOPRAZOLE SODIUM 40 MG/1
40 TABLET, DELAYED RELEASE ORAL
Status: DISCONTINUED | OUTPATIENT
Start: 2023-10-01 | End: 2023-10-01 | Stop reason: HOSPADM

## 2023-09-30 RX ORDER — ACETAMINOPHEN 650 MG/1
650 SUPPOSITORY RECTAL EVERY 6 HOURS PRN
Status: CANCELLED | OUTPATIENT
Start: 2023-09-30

## 2023-09-30 RX ORDER — ACYCLOVIR 200 MG/1
10 CAPSULE ORAL ONCE
Status: COMPLETED | OUTPATIENT
Start: 2023-09-30 | End: 2023-09-30

## 2023-09-30 RX ORDER — HYDRALAZINE HYDROCHLORIDE 20 MG/ML
10 INJECTION INTRAMUSCULAR; INTRAVENOUS EVERY 4 HOURS PRN
Status: DISCONTINUED | OUTPATIENT
Start: 2023-09-30 | End: 2023-10-01 | Stop reason: HOSPADM

## 2023-09-30 RX ORDER — SODIUM CHLORIDE 9 MG/ML
INJECTION, SOLUTION INTRAVENOUS PRN
Status: CANCELLED | OUTPATIENT
Start: 2023-09-30

## 2023-09-30 RX ORDER — INSULIN LISPRO 100 [IU]/ML
0-4 INJECTION, SOLUTION INTRAVENOUS; SUBCUTANEOUS NIGHTLY
Status: DISCONTINUED | OUTPATIENT
Start: 2023-09-30 | End: 2023-10-01 | Stop reason: HOSPADM

## 2023-09-30 RX ORDER — POLYETHYLENE GLYCOL 3350 17 G/17G
17 POWDER, FOR SOLUTION ORAL DAILY PRN
Status: DISCONTINUED | OUTPATIENT
Start: 2023-09-30 | End: 2023-10-01 | Stop reason: HOSPADM

## 2023-09-30 RX ORDER — DEXTROSE MONOHYDRATE 100 MG/ML
INJECTION, SOLUTION INTRAVENOUS CONTINUOUS PRN
Status: DISCONTINUED | OUTPATIENT
Start: 2023-09-30 | End: 2023-10-01 | Stop reason: HOSPADM

## 2023-09-30 RX ORDER — ASPIRIN 81 MG/1
81 TABLET, CHEWABLE ORAL
Status: COMPLETED | OUTPATIENT
Start: 2023-09-30 | End: 2023-09-30

## 2023-09-30 RX ORDER — BUPROPION HYDROCHLORIDE 150 MG/1
150 TABLET ORAL 2 TIMES DAILY
Status: DISCONTINUED | OUTPATIENT
Start: 2023-09-30 | End: 2023-10-01 | Stop reason: HOSPADM

## 2023-09-30 RX ORDER — ENOXAPARIN SODIUM 100 MG/ML
40 INJECTION SUBCUTANEOUS DAILY
Status: CANCELLED | OUTPATIENT
Start: 2023-09-30

## 2023-09-30 RX ORDER — ONDANSETRON 2 MG/ML
4 INJECTION INTRAMUSCULAR; INTRAVENOUS EVERY 6 HOURS PRN
Status: DISCONTINUED | OUTPATIENT
Start: 2023-09-30 | End: 2023-10-01 | Stop reason: HOSPADM

## 2023-09-30 RX ORDER — ONDANSETRON 4 MG/1
4 TABLET, ORALLY DISINTEGRATING ORAL EVERY 8 HOURS PRN
Status: DISCONTINUED | OUTPATIENT
Start: 2023-09-30 | End: 2023-10-01 | Stop reason: HOSPADM

## 2023-09-30 RX ORDER — GLUCAGON 1 MG
1 KIT INJECTION PRN
Status: DISCONTINUED | OUTPATIENT
Start: 2023-09-30 | End: 2023-10-01 | Stop reason: HOSPADM

## 2023-09-30 RX ORDER — POLYETHYLENE GLYCOL 3350 17 G/17G
17 POWDER, FOR SOLUTION ORAL DAILY PRN
Status: CANCELLED | OUTPATIENT
Start: 2023-09-30

## 2023-09-30 RX ORDER — OXYBUTYNIN CHLORIDE 5 MG/1
5 TABLET ORAL 2 TIMES DAILY
Status: DISCONTINUED | OUTPATIENT
Start: 2023-09-30 | End: 2023-10-01 | Stop reason: HOSPADM

## 2023-09-30 RX ADMIN — SODIUM CHLORIDE 10 ML: 9 INJECTION INTRAMUSCULAR; INTRAVENOUS; SUBCUTANEOUS at 11:09

## 2023-09-30 RX ADMIN — TAMSULOSIN HYDROCHLORIDE 0.4 MG: 0.4 CAPSULE ORAL at 16:13

## 2023-09-30 RX ADMIN — ASPIRIN 81 MG: 81 TABLET, CHEWABLE ORAL at 02:08

## 2023-09-30 RX ADMIN — SERTRALINE 100 MG: 50 TABLET, FILM COATED ORAL at 16:13

## 2023-09-30 RX ADMIN — OXYBUTYNIN CHLORIDE 5 MG: 5 TABLET ORAL at 20:55

## 2023-09-30 RX ADMIN — INSULIN LISPRO 1 UNITS: 100 INJECTION, SOLUTION INTRAVENOUS; SUBCUTANEOUS at 08:30

## 2023-09-30 RX ADMIN — ATORVASTATIN CALCIUM 20 MG: 20 TABLET, FILM COATED ORAL at 20:55

## 2023-09-30 RX ADMIN — ENOXAPARIN SODIUM 40 MG: 100 INJECTION SUBCUTANEOUS at 08:53

## 2023-09-30 RX ADMIN — RISPERIDONE 6 MG: 2 TABLET ORAL at 17:10

## 2023-09-30 RX ADMIN — SODIUM CHLORIDE, PRESERVATIVE FREE 10 ML: 5 INJECTION INTRAVENOUS at 08:54

## 2023-09-30 RX ADMIN — SODIUM CHLORIDE, PRESERVATIVE FREE 10 ML: 5 INJECTION INTRAVENOUS at 20:55

## 2023-09-30 RX ADMIN — ASPIRIN 81 MG: 81 TABLET, CHEWABLE ORAL at 08:53

## 2023-09-30 RX ADMIN — INSULIN LISPRO 3 UNITS: 100 INJECTION, SOLUTION INTRAVENOUS; SUBCUTANEOUS at 17:11

## 2023-09-30 RX ADMIN — INSULIN GLARGINE 10 UNITS: 100 INJECTION, SOLUTION SUBCUTANEOUS at 20:55

## 2023-09-30 RX ADMIN — BUPROPION HYDROCHLORIDE 150 MG: 150 TABLET, FILM COATED, EXTENDED RELEASE ORAL at 20:55

## 2023-09-30 ASSESSMENT — PAIN SCALES - GENERAL
PAINLEVEL_OUTOF10: 0

## 2023-09-30 NOTE — H&P
History and Physical      CC: Loss of memory    Subjective     HPI: Ariane Eaton is a 74 y.o. male presenting as transfer from [facility] ED with wife for episode of memory loss afternoon prior to admission. Past medical history of chronic hypertension, DM2 with glucosuria. Patient lost sense of direction in spite of heavy no history of memory loss or dementia. Patient transferred to [facility] for observation    PMHx:  Past Medical History:   Diagnosis Date    Diabetes [facility])     History of cholecystectomy     Hypertension     Schizoaffective disorder [facility])        PSurgHx:  History reviewed. No pertinent surgical history. SocialHx:  Social History     Socioeconomic History    Marital status:      Spouse name: None    Number of children: None    Years of education: None    Highest education level: None   Tobacco Use    Smoking status: Never   Substance and Sexual Activity    Alcohol use: No    Drug use: No       FamilyHx:  Family History   Problem Relation Age of Onset    Cancer Other     Diabetes Other     Hypertension Other     Stroke Father     Heart Disease Brother        Home Medications:  Prior to Admission medications    Medication Sig Start Date End Date Taking?  Authorizing Provider   naproxen (NAPROSYN) 500 MG tablet Take 1 tablet by mouth 2 times daily as needed for Pain 9/21/23   MOUNA Ash NP   acetaminophen (TYLENOL) 325 MG tablet Take 650 mg by mouth every 4 hours as needed 10/6/15   Ar Automatic Reconciliation   amLODIPine (NORVASC) 10 MG tablet Take 5 mg by mouth daily    Ar Automatic Reconciliation   atorvastatin (LIPITOR) 20 MG tablet Take 20 mg by mouth daily    Ar Automatic Reconciliation   azelastine (ASTELIN) 0.1 % nasal spray 1 spray by Nasal route 2 times daily 11/22/19   Ar Automatic Reconciliation   buPROPion (WELLBUTRIN SR) 150 MG extended release tablet Take 150 mg by mouth 2 times daily    Ar Automatic Reconciliation   carBAMazepine (TEGRETOL XR) Monocytes Absolute 0.8 0.05 - 1.2 K/UL    Eosinophils Absolute 0.1 0.0 - 0.4 K/UL    Basophils Absolute 0.0 0.0 - 0.1 K/UL    Absolute Immature Granulocyte 0.0 0.00 - 0.04 K/UL    Differential Type AUTOMATED     Comprehensive Metabolic Panel    Collection Time: 09/29/23  8:50 PM   Result Value Ref Range    Sodium 137 136 - 145 mmol/L    Potassium 3.6 3.5 - 5.5 mmol/L    Chloride 106 100 - 111 mmol/L    CO2 23 21 - 32 mmol/L    Anion Gap 8 3.0 - 18 mmol/L    Glucose 296 (H) 74 - 99 mg/dL    BUN 6 (L) 7.0 - 18 MG/DL    Creatinine 0.94 0.6 - 1.3 MG/DL    Bun/Cre Ratio 6 (L) 12 - 20      Est, Glom Filt Rate >60 >60 ml/min/1.73m2    Calcium 8.9 8.5 - 10.1 MG/DL    Total Bilirubin 0.3 0.2 - 1.0 MG/DL    ALT 36 16 - 61 U/L    AST 27 10 - 38 U/L    Alk Phosphatase 71 45 - 117 U/L    Total Protein 7.5 6.4 - 8.2 g/dL    Albumin 3.6 3.4 - 5.0 g/dL    Globulin 3.9 2.0 - 4.0 g/dL    Albumin/Globulin Ratio 0.9 0.8 - 1.7     Protime-INR    Collection Time: 09/29/23  8:50 PM   Result Value Ref Range    Protime 13.6 11.9 - 14.7 sec    INR 1.0 0.9 - 1.1     Troponin    Collection Time: 09/29/23  8:50 PM   Result Value Ref Range    Troponin, High Sensitivity 7 0 - 78 ng/L   EKG 12 Lead    Collection Time: 09/29/23  9:12 PM   Result Value Ref Range    Ventricular Rate 72 BPM    Atrial Rate 72 BPM    P-R Interval 196 ms    QRS Duration 84 ms    Q-T Interval 382 ms    QTc Calculation (Bazett) 418 ms    P Axis 54 degrees    R Axis -25 degrees    T Axis 32 degrees    Diagnosis       Normal sinus rhythm  Possible Left atrial enlargement  Left ventricular hypertrophy  Cannot rule out Septal infarct , age undetermined  Abnormal ECG  When compared with ECG of 14-MAY-2016 20:29,  Minimal criteria for Septal infarct are now present     Urinalysis    Collection Time: 09/29/23 10:55 PM   Result Value Ref Range    Color, UA YELLOW      Appearance CLEAR      Specific Gravity, UA 1.015 1.003 - 1.030      pH, Urine 7.5 5.0 - 8.0      Protein, UA Negative NEG mg/dL    Glucose,  (A) NEG mg/dL    Ketones, Urine Negative NEG mg/dL    Bilirubin Urine Negative NEG      Blood, Urine MODERATE (A) NEG      Urobilinogen, Urine 0.2 0.2 - 1.0 EU/dL    Nitrite, Urine Negative NEG      Leukocyte Esterase, Urine Negative NEG     Urinalysis, Micro    Collection Time: 09/29/23 10:55 PM   Result Value Ref Range    WBC, UA 4 to 10 0 - 4 /hpf    RBC, UA 36 to 50 0 - 5 /hpf    Epithelial Cells UA 2+ 0 - 5 /lpf    BACTERIA, URINE Negative NEG /hpf   POCT Glucose    Collection Time: 09/29/23 11:38 PM   Result Value Ref Range    POC Glucose 115 (H) 70 - 110 mg/dL   POCT Glucose    Collection Time: 09/30/23  6:46 AM   Result Value Ref Range    POC Glucose 225 (H) 70 - 110 mg/dL       Imaging Reviewed:  CT HEAD WO CONTRAST    Result Date: 9/29/2023  CT Of The Head Without Contrast CPT CODE:  92607 CLINICAL HISTORY: Memory loss this afternoon. Outside of TPA window per ordering MD. Code Stroke CT Head TECHNIQUE: 5 mm helical scan obtained of the head. All CT scans at this facility are performed using dose optimization techniques as appropriate to a performed exam, to include automated exposure control, adjustment of the mA and/or kV according to patient's size (including appropriate matching for site specific examinations), or use of iterative reconstruction technique. COMPARISON: 2/10/2015 FINDINGS: No midline shift, mass effect or abnormal intra-axial or extraaxial fluid collection or hydrocephalus is seen. There is mild to moderate atrophy. No hemorrhage identified. No mass lesion identified. No acute infarction identified. No acute abnormalities. Atrophy. D/w Dr. Nimo Jules. Atrophy. XR CHEST PORTABLE    Result Date: 9/29/2023  EXAMINATION: Chest single view INDICATION: Stroke COMPARISON: 10/15/2009 FINDINGS: Single frontal view. O lung volumes. Mediastinal silhouette and pulmonary vasculature unremarkable. Left lung base hazy streaky density.  No confluent

## 2023-09-30 NOTE — ED PROVIDER NOTES
[facility] EMERGENCY DEPT  eMERGENCY dEPARTMENT eNCOUnter      Pt Name: Adin Welch  MRN: [id number]  [address] [facility] 1950 of evaluation: 9/29/2023  Provider:Bassam Brown Asif is a 74 y.o. male   accompanied by wife who reports pt had an episode of memory loss this afternoon; left home to get a pizza and could not find his way home. Wife states pt has no h/o memory loss or dementia. Pt is a/o to person, place, situation; he is unaware of the month or year. ROS    Review of Systems   Constitutional:  Positive for activity change. Negative for appetite change and fever. Respiratory: Negative. Cardiovascular: Negative. Gastrointestinal: Negative. Genitourinary:         (+) occasional urinary incontinent   Musculoskeletal: Negative. Neurological:  Negative for dizziness, tremors, seizures, syncope, facial asymmetry, weakness, light-headedness, numbness and headaches. Psychiatric/Behavioral:  Negative for agitation, behavioral problems, confusion and hallucinations. The patient is not nervous/anxious. Except as noted above the remainder of the review of systems was reviewed and negative. PAST MEDICAL HISTORY     Past Medical History:   Diagnosis Date    Diabetes ([address])     History of cholecystectomy     Hypertension     Schizoaffective disorder ([facility])          SURGICAL HISTORY     History reviewed. No pertinent surgical history.       CURRENTMEDICATIONS       Previous Medications    ACETAMINOPHEN (TYLENOL) 325 MG TABLET    Take 650 mg by mouth every 4 hours as needed    AMLODIPINE (NORVASC) 10 MG TABLET    Take 5 mg by mouth daily    ATORVASTATIN (LIPITOR) 20 MG TABLET    Take 20 mg by mouth daily    AZELASTINE (ASTELIN) 0.1 % NASAL SPRAY    1 spray by Nasal route 2 times daily    BUPROPION (WELLBUTRIN SR) 150 MG EXTENDED RELEASE TABLET    Take 150 mg by mouth 2 times daily    CARBAMAZEPINE (TEGRETOL XR) 400 MG EXTENDED RELEASE TABLET    Take 800

## 2023-09-30 NOTE — ACP (ADVANCE CARE PLANNING)
Advance Care Planning     Advance Care Planning Inpatient Note  [facility] Department    Today's Date: 9/30/2023  Unit: Deepika Foss    Received request from rounding and patient. Upon review of chart and communication with care team, patient's decision making abilities are not in question. . Patient was/were present in the room during visit. Goals of ACP Conversation:  Discuss advance care planning documents  Facilitate a discussion related to patient's goals of care as they align with the patient's values and beliefs. Health Care Decision Makers:       Primary Decision Maker: Lilliana Osullivan - [phone]    Secondary Decision Maker: Gwen Valera Child - [phone]  Summary:  Completed [facility]  Documented Next of Kin, per patient report    Advance Care Planning Documents (Patient Wishes):  Healthcare Power of /Advance Directive Appointment of [address] [facility] [place]  Living Will/Advance Directive  Anatomical Gift/Organ Donation   Patient does not want to prolong his life through heroic measures if his condition is terminal and no recovery is expected. He would rather be comfortable. If he sustains permanent brain injury or in a persistent vegetative state, he states, \" I want my wife to decide for me at that point. \" Patient expressed his desire to be able to donate any viable tissue he has,  Assessment:  Patient is grateful for the assistance in completing this Advance Medical Directive which he hopes would ease the burden for his wife and his daughter to decide later on. Patient admittedly had a hard time making decision on the second healthcare instruction and so opted to relinquish that decision to his wife who would be in conversation with the medical team at that point.   Interventions:  Provided education on documents for clarity and greater understanding  Discussed and provided education on state decision maker hierarchy  Assisted in the completion of documents according to patient's wishes at this time  Encouraged ongoing ACP conversation with future decision makers and loved ones    Care Preferences Communicated:   No    Outcomes/Plan:  ACP Discussion: Completed  New advance directive completed. Returned original document(s) to patient, as well as copies for distribution to appointed agents  Copy of advance directive given to staff to scan into medical record. Routed ACP note to attending provider or other IDT member.   Teach Back Method used to verify the patient's and/or Healthcare Decision Maker's understanding of key information in the advance directive documents    Electronically signed by Jonatan Posadas, [address] [place] [address] on 9/30/2023 at 10:53 AM

## 2023-09-30 NOTE — ED NOTES
Call placed to wife by patient. Aware of transfer to [facility].       Florin Wallace RN  09/30/23 6229

## 2023-09-30 NOTE — PROGRESS NOTES
Pharmacist Review and Automatic Dose Adjustment of Prophylactic Enoxaparin    *Review reason for admission/hospital problem list*    The reviewing pharmacist has made an adjustment to the ordered enoxaparin dose or converted to UFH per the approved [facility] protocol and table as identified below. Florence Perez is a 74 y.o. male. Recent Labs     09/29/23 2050   CREATININE 0.94       Estimated Creatinine Clearance: 81 mL/min (based on SCr of 0.94 mg/dL).     Height:   Ht Readings from Last 1 Encounters:   09/29/23 5' 11\" (1.803 m)     Weight:  Wt Readings from Last 1 Encounters:   09/29/23 215 lb (97.5 kg)                   Estimated CrCl  (ml/min) Patient Weight (kg)     50.9and below .9 101-150.9 151-174.9 175 or greater    30 or greater 30 mg SUBQ daily   40 mg SUBQ daily (or 30mg BID for orthopedic cases)   30 mg SUBQ BID 40 mg SUBQ BID 60mg SUBQ BID    15-29.9 UFH 5000 units SUBQ BID 30 mg SUBQ daily 30 mg SUBQ daily 40 mg SUBQ daily 60 mg SUBQ daily    Less than 15or dialysis UFH 5000 units SUBQ BID UFH 5000 units SUBQ TID UFH 7500 units SUBQ TID        Plan: Based upon the patient's weight and renal function, the ordered enoxaparin dose of 30mg daily has been changed/converted to 40 mg daily      Thank you,  Sara Ramos, [facility]

## 2023-09-30 NOTE — PROGRESS NOTES
Received pt from [facility] into room 406; Tele box placed on pt, vitals and blood gluc obtained.

## 2023-09-30 NOTE — ED TRIAGE NOTES
Pt here accompanied by wife who reports pt had an episode of memory loss this afternoon; left home to get a pizza and could not find his way home. Wife states pt has no h/o memory loss or dementia. Pt is a/o to person, place, situation; he is unaware of the month or year.

## 2023-09-30 NOTE — ED NOTES
Dr Jocelyne Bartlett aware of pt's complaints, condition, and location.       Ashly Santillan RN  09/29/23 2050

## 2023-10-01 ENCOUNTER — APPOINTMENT (OUTPATIENT)
Facility: HOSPITAL | Age: 73
End: 2023-10-01
Payer: MEDICARE

## 2023-10-01 VITALS
DIASTOLIC BLOOD PRESSURE: 77 MMHG | BODY MASS INDEX: 30.1 KG/M2 | HEART RATE: 109 BPM | HEIGHT: 71 IN | TEMPERATURE: 97.3 F | OXYGEN SATURATION: 100 % | SYSTOLIC BLOOD PRESSURE: 114 MMHG | WEIGHT: 215 LBS | RESPIRATION RATE: 19 BRPM

## 2023-10-01 LAB
ANION GAP SERPL CALC-SCNC: 5 MMOL/L (ref 3–18)
BUN SERPL-MCNC: 12 MG/DL (ref 7–18)
BUN/CREAT SERPL: 16 (ref 12–20)
CALCIUM SERPL-MCNC: 9 MG/DL (ref 8.5–10.1)
CHLORIDE SERPL-SCNC: 110 MMOL/L (ref 100–111)
CO2 SERPL-SCNC: 23 MMOL/L (ref 21–32)
CREAT SERPL-MCNC: 0.74 MG/DL (ref 0.6–1.3)
ERYTHROCYTE [DISTWIDTH] IN BLOOD BY AUTOMATED COUNT: 12.3 % (ref 11.6–14.5)
GLUCOSE BLD STRIP.AUTO-MCNC: 175 MG/DL (ref 70–110)
GLUCOSE BLD STRIP.AUTO-MCNC: 190 MG/DL (ref 70–110)
GLUCOSE SERPL-MCNC: 220 MG/DL (ref 74–99)
HCT VFR BLD AUTO: 42 % (ref 36–48)
HGB BLD-MCNC: 14.8 G/DL (ref 13–16)
MCH RBC QN AUTO: 29.8 PG (ref 24–34)
MCHC RBC AUTO-ENTMCNC: 35.2 G/DL (ref 31–37)
MCV RBC AUTO: 84.7 FL (ref 78–100)
NRBC # BLD: 0 K/UL (ref 0–0.01)
NRBC BLD-RTO: 0 PER 100 WBC
PLATELET # BLD AUTO: 216 K/UL (ref 135–420)
PMV BLD AUTO: 8.9 FL (ref 9.2–11.8)
POTASSIUM SERPL-SCNC: 3.6 MMOL/L (ref 3.5–5.5)
RBC # BLD AUTO: 4.96 M/UL (ref 4.35–5.65)
SODIUM SERPL-SCNC: 138 MMOL/L (ref 136–145)
WBC # BLD AUTO: 9.4 K/UL (ref 4.6–13.2)

## 2023-10-01 PROCEDURE — 99239 HOSP IP/OBS DSCHRG MGMT >30: CPT | Performed by: STUDENT IN AN ORGANIZED HEALTH CARE EDUCATION/TRAINING PROGRAM

## 2023-10-01 PROCEDURE — 85027 COMPLETE CBC AUTOMATED: CPT

## 2023-10-01 PROCEDURE — 6360000002 HC RX W HCPCS: Performed by: INTERNAL MEDICINE

## 2023-10-01 PROCEDURE — 6370000000 HC RX 637 (ALT 250 FOR IP): Performed by: INTERNAL MEDICINE

## 2023-10-01 PROCEDURE — G0378 HOSPITAL OBSERVATION PER HR: HCPCS

## 2023-10-01 PROCEDURE — 70551 MRI BRAIN STEM W/O DYE: CPT

## 2023-10-01 PROCEDURE — 97165 OT EVAL LOW COMPLEX 30 MIN: CPT

## 2023-10-01 PROCEDURE — 2580000003 HC RX 258: Performed by: INTERNAL MEDICINE

## 2023-10-01 PROCEDURE — 97161 PT EVAL LOW COMPLEX 20 MIN: CPT

## 2023-10-01 PROCEDURE — 82962 GLUCOSE BLOOD TEST: CPT

## 2023-10-01 PROCEDURE — 6370000000 HC RX 637 (ALT 250 FOR IP): Performed by: STUDENT IN AN ORGANIZED HEALTH CARE EDUCATION/TRAINING PROGRAM

## 2023-10-01 PROCEDURE — 94761 N-INVAS EAR/PLS OXIMETRY MLT: CPT

## 2023-10-01 PROCEDURE — 36415 COLL VENOUS BLD VENIPUNCTURE: CPT

## 2023-10-01 PROCEDURE — 96372 THER/PROPH/DIAG INJ SC/IM: CPT

## 2023-10-01 PROCEDURE — 80048 BASIC METABOLIC PNL TOTAL CA: CPT

## 2023-10-01 PROCEDURE — 97116 GAIT TRAINING THERAPY: CPT

## 2023-10-01 RX ADMIN — SODIUM CHLORIDE, PRESERVATIVE FREE 10 ML: 5 INJECTION INTRAVENOUS at 09:16

## 2023-10-01 RX ADMIN — INSULIN LISPRO 3 UNITS: 100 INJECTION, SOLUTION INTRAVENOUS; SUBCUTANEOUS at 09:11

## 2023-10-01 RX ADMIN — OXYBUTYNIN CHLORIDE 5 MG: 5 TABLET ORAL at 09:12

## 2023-10-01 RX ADMIN — ASPIRIN 81 MG: 81 TABLET, CHEWABLE ORAL at 09:12

## 2023-10-01 RX ADMIN — PANTOPRAZOLE SODIUM 40 MG: 40 TABLET, DELAYED RELEASE ORAL at 09:12

## 2023-10-01 RX ADMIN — TAMSULOSIN HYDROCHLORIDE 0.4 MG: 0.4 CAPSULE ORAL at 09:12

## 2023-10-01 RX ADMIN — SERTRALINE 100 MG: 50 TABLET, FILM COATED ORAL at 09:12

## 2023-10-01 RX ADMIN — ENOXAPARIN SODIUM 40 MG: 100 INJECTION SUBCUTANEOUS at 09:11

## 2023-10-01 RX ADMIN — INSULIN LISPRO 3 UNITS: 100 INJECTION, SOLUTION INTRAVENOUS; SUBCUTANEOUS at 13:30

## 2023-10-01 RX ADMIN — RISPERIDONE 6 MG: 2 TABLET ORAL at 09:12

## 2023-10-01 RX ADMIN — BUPROPION HYDROCHLORIDE 150 MG: 150 TABLET, FILM COATED, EXTENDED RELEASE ORAL at 09:12

## 2023-10-01 ASSESSMENT — PAIN DESCRIPTION - DESCRIPTORS: DESCRIPTORS: ACHING

## 2023-10-01 ASSESSMENT — PAIN DESCRIPTION - LOCATION: LOCATION: KNEE

## 2023-10-01 ASSESSMENT — PAIN SCALES - GENERAL
PAINLEVEL_OUTOF10: 4
PAINLEVEL_OUTOF10: 0
PAINLEVEL_OUTOF10: 0

## 2023-10-01 ASSESSMENT — PAIN DESCRIPTION - ORIENTATION: ORIENTATION: RIGHT

## 2023-10-01 NOTE — PLAN OF CARE
Problem: Physical Therapy - Adult  Goal: By Discharge: Performs mobility at highest level of function for planned discharge setting. See evaluation for individualized goals. Description: Physical Therapy Goals:  Initiated 10/1/2023 to be met within 7-10 days. 1.  Patient will move from supine to sit and sit to supine  in bed with independence. 2.  Patient will transfer from bed to chair and chair to bed with modified independence using the least restrictive device. 3.  Patient will perform sit to stand with modified independence. 4.  Patient will ambulate with modified independence for 250 feet with the least restrictive device. 5.  Patient will ascend/descend 4 stairs with (U) handrail(s) with modified independence. PLOF: Independent, recently used SPC at times due to fall a few weeks ago. Wife is able to assist if needed. Outcome: Progressing     PHYSICAL THERAPY EVALUATION    Patient: Fadia Umana (4 y.o. male)  Date: 10/1/2023  Primary Diagnosis: TIA (transient ischemic attack) [G45.9]       Precautions: General Precautions,  ,  ,  ,  ,  ,  ,      ASSESSMENT :  Pt presents with very minimally impaired functional mobility and generalized weakness 2/2 admission. Pt reports he fell a few weeks ago and is near his baseline in regards to strength; however, his RLE still hurts posteriorly and he uses a SPC at times at home when walking. Pt ambulated with no symptomatic complaints; however, noted limited, guarded arm swing (B) with forward flexed posture. Pt educated on benefits of outpatient PT to address functional strength and decrease risk of falls, pt verbalized understanding. From PT perspective, pt is cleared to d/c home, will continue to follow during acute stay.     DEFICITS/IMPAIRMENTS:    , Body Structures, Functions, Activity Limitations Requiring Skilled Therapeutic Intervention: Decreased endurance;Decreased balance;Decreased strength    Patient will benefit from skilled History of cholecystectomy     Hypertension     Schizoaffective disorder ([address])    History reviewed. No pertinent surgical history. Home Situation:  Social/Functional History  Lives With: Spouse  Type of Home: House  Home Layout: One level  Home Access: Stairs to enter with rails  Entrance Stairs - Number of Steps: 5  Entrance Stairs - Rails: Both  Bathroom Shower/Tub: Tub/Shower unit  Bathroom Toilet: Standard  Bathroom Equipment: None  Home Equipment: Cane  Receives Help From: Family  ADL Assistance: Independent  Homemaking Assistance: Independent  Occupation: Retired  Type of Occupation: retired from [employer]  Critical Behavior:  Orientation  Overall Orientation Status: Within Normal Limits  Orientation Level: Oriented X4  Cognition  Overall Cognitive Status: WFL  Strength:    Strength: Within functional limits  Tone & Sensation:   Tone: Normal  Sensation: Intact  Range Of Motion:  AROM: Within functional limits  PROM: Within functional limits  Functional Mobility:  Bed Mobility:  Bed Mobility Training  Bed Mobility Training: Yes  Supine to Sit: Modified independent  Transfers:  Transfer Training  Transfer Training: Yes  Sit to Stand: Modified independent  Stand to Sit: Modified independent  Balance:   Balance  Sitting: Intact  Standing: Impaired  Standing - Static: Good  Standing - Dynamic: Fair  Ambulation/Gait Training:  Gait  Overall Level of Assistance: Supervision  Gait Abnormalities: Altered arm swing; Other (comment) (forward flexed posture)  Distance (ft): 250 Feet  Assistive Device: Other (comment) (none)  Pain:  Pain level pre-treatment: 0/10   Pain level post-treatment: 0/10   Pain Intervention(s): Medication (see MAR); Rest, Ice, Repositioning  Response to intervention: Nurse notified     Activity Tolerance:   Activity Tolerance: Patient tolerated evaluation without incident  Please refer to the flowsheet for vital signs taken during this treatment.     After treatment:   [x]         Patient left in no

## 2023-10-01 NOTE — CARE COORDINATION
Discharge order noted for today. Orders received. Home health orders noted. Patient states he gets everything done through the [address] [place] [address]. States his doctor there is Dr. Sadaf Valdez. [facility] currently closed today on Sunday. Will fax clinicals with home health orders to the [facility] at the [address]. Will also provide pt and wife a copy of his home health order. Informed wife that paperwork will be faxed to the [address] [place] [address] and if she does not hear from them on Monday, to please reach out to his [address] [place] [address] doctors office. Wife states understanding. No other needs identified at this time. Case management remains available as needed. Wife will transport pt home at time of discharge.     [facility] RN CDCES  Case Management

## 2023-10-01 NOTE — CARE COORDINATION
Case Management Assessment  Initial Evaluation    Date/Time of Evaluation: 10/1/2023 12:25 PM  Assessment Completed by: Grant Baker RN    If patient is discharged prior to next notation, then this note serves as note for discharge by case management. Patient Name: Dakota Watson                   YOB: 1950  Diagnosis: TIA (transient ischemic attack) [G45.9]                   Date / Time: 9/29/2023  8:39 PM    Patient Admission Status: Observation   Readmission Risk (Low < 19, Mod (19-27), High > 27): No data recorded  Current PCP: PROVIDER UNKNOWN  PCP verified by CM? (P) Yes (Dr. Jeferson Corey with the [place])    Chart Reviewed: Yes      History Provided by: (P) Patient  Patient Orientation: (P) Alert and Oriented    Patient Cognition: (P) Alert    Hospitalization in the last 30 days (Readmission):  No    If yes, Readmission Assessment in CM Navigator will be completed.     Advance Directives:      Code Status: Full Code   Patient's Primary Decision Maker is: Named in Scanned ACP Document    Primary Decision Maker: Rosa Alcantara - Spouse - [phone]    Secondary Decision Maker: Lianne [facility] Child - [phone]    Discharge Planning:    Patient lives with: (P) Spouse/Significant Other Type of Home: (P) House  Primary Care Giver: (P) Self  Patient Support Systems include: (P) Spouse/Significant Other   Current Financial resources: (P) Medicare, Other (Comment) ( for life)  Current community resources:    Current services prior to admission: (P) C-pap            Current DME:              Type of Home Care services:  (P) PT    ADLS  Prior functional level: (P) Independent in ADLs/IADLs  Current functional level: (P) Independent in ADLs/IADLs    PT AM-PAC: 22 /24  OT AM-PAC:   /24    Family can provide assistance at DC: (P) Yes  Would you like Case Management to discuss the discharge plan with any other family members/significant others, and if so, who? (P) Yes (wife)  Plans to Return to Present Housing: (P) Yes  Other Identified Issues/Barriers to RETURNING to current housing: none noted  Potential Assistance needed at discharge: N/A            Potential DME:    Patient expects to discharge to: (P) [id number] for transportation at discharge: (P) [facility]: [address] / Plan: MEDICARE PART A AND B / Product Type: *No Product type* /     Does insurance require precert for SNF: no    Potential assistance Purchasing Medications: (P) No  Meds-to-Beds request:        [facility], [address] [place] [address] [phone] Smitha Luu [phone]  [address] [place] [address],Building [facility] [address]  Phone: [phone] Fax: [phone]      Notes:    Factors facilitating achievement of predicted outcomes: Family support, Motivated, Cooperative, Pleasant, Sense of humor, and Good insight into deficits    Barriers to discharge: Medical complications    Additional Case Management Notes: The Plan for Transition of Care is related to the following treatment goals of TIA (transient ischemic attack) [P01.8]    IF APPLICABLE: The Patient and/or patient representative Basilio Chapin and his family were provided with a choice of provider and agrees with the discharge plan. Freedom of choice list with basic dialogue that supports the patient's individualized plan of care/goals and shares the quality data associated with the providers was provided to: (P) Patient   Patient Representative Name:       The Patient and/or Patient Representative Agree with the Discharge Plan?  (P) Yes    Lisa Murphy RN  Case Management Department  Ph: [phone]       10/01/23 1220   Service Assessment   Patient Orientation Alert and Oriented   Cognition Alert   History Provided By Patient   Primary Caregiver Self   Support Systems Spouse/Significant Other   PCP Verified by CM Yes  (Dr. Socorro Allan with [facility])   Last Visit to PCP Within last 3 months   Prior Functional Level Independent in ADLs/IADLs   Current

## 2023-10-01 NOTE — PROGRESS NOTES
The patient has been discharged. A copy of the AVS has been prepared, reviewed, and sent home with the patient. RN verified with patients wife that she was contacted regarding next actions about the patients home care orders. The patient and his wife did not have any questions regarding the patients discharge at this time. Wife to transport the patient home.         Medication List        CONTINUE taking these medications      acetaminophen 325 MG tablet  Commonly known as: TYLENOL     amLODIPine 10 MG tablet  Commonly known as: NORVASC     atorvastatin 20 MG tablet  Commonly known as: LIPITOR     azelastine 0.1 % nasal spray  Commonly known as: ASTELIN     buPROPion 150 MG extended release tablet  Commonly known as: WELLBUTRIN SR     carBAMazepine 400 MG extended release tablet  Commonly known as: TEGRETOL XR     diclofenac sodium 1 % Gel  Commonly known as: VOLTAREN     docusate 100 MG Caps  Commonly known as: COLACE, DULCOLAX     glipiZIDE 5 MG tablet  Commonly known as: GLUCOTROL     metFORMIN 500 MG tablet  Commonly known as: GLUCOPHAGE     naproxen 500 MG tablet  Commonly known as: NAPROSYN  Take 1 tablet by mouth 2 times daily as needed for Pain     omeprazole 20 MG delayed release capsule  Commonly known as: PRILOSEC     oxybutynin 5 MG tablet  Commonly known as: DITROPAN     risperiDONE 3 MG tablet  Commonly known as: RISPERDAL     sertraline 100 MG tablet  Commonly known as: ZOLOFT     sildenafil 100 MG tablet  Commonly known as: VIAGRA     tamsulosin 0.4 MG capsule  Commonly known as: FLOMAX            STOP taking these medications      nystatin [id number] UNIT/GM powder  Commonly known as: MYCOSTATIN

## 2024-01-08 ENCOUNTER — HOSPITAL ENCOUNTER (INPATIENT)
Facility: HOSPITAL | Age: 74
LOS: 4 days | Discharge: HOME | End: 2024-01-12
Attending: INTERNAL MEDICINE | Admitting: INTERNAL MEDICINE
Payer: MEDICARE

## 2024-01-08 ENCOUNTER — APPOINTMENT (OUTPATIENT)
Facility: HOSPITAL | Age: 74
End: 2024-01-08
Payer: MEDICARE

## 2024-01-08 DIAGNOSIS — R65.20 SEVERE SEPSIS (HCC): ICD-10-CM

## 2024-01-08 DIAGNOSIS — E11.65 HYPERGLYCEMIA DUE TO DIABETES MELLITUS (HCC): Primary | ICD-10-CM

## 2024-01-08 DIAGNOSIS — E87.6 HYPOKALEMIA: ICD-10-CM

## 2024-01-08 DIAGNOSIS — A41.9 SEVERE SEPSIS (HCC): ICD-10-CM

## 2024-01-08 PROBLEM — Z86.73 HISTORY OF TIA (TRANSIENT ISCHEMIC ATTACK): Chronic | Status: ACTIVE | Noted: 2024-01-08

## 2024-01-08 PROBLEM — F25.9 SCHIZOAFFECTIVE DISORDER (HCC): Status: ACTIVE | Noted: 2024-01-08

## 2024-01-08 PROBLEM — N39.0 ACUTE UTI: Chronic | Status: ACTIVE | Noted: 2024-01-08

## 2024-01-08 PROBLEM — Z86.73 HISTORY OF TIA (TRANSIENT ISCHEMIC ATTACK): Status: ACTIVE | Noted: 2024-01-08

## 2024-01-08 PROBLEM — N39.0 ACUTE UTI: Status: ACTIVE | Noted: 2024-01-08

## 2024-01-08 PROBLEM — E11.9 TYPE 2 DIABETES MELLITUS (HCC): Chronic | Status: ACTIVE | Noted: 2023-09-30

## 2024-01-08 PROBLEM — F25.9 SCHIZOAFFECTIVE DISORDER (HCC): Chronic | Status: ACTIVE | Noted: 2024-01-08

## 2024-01-08 PROBLEM — I10 HYPERTENSION: Chronic | Status: ACTIVE | Noted: 2023-09-30

## 2024-01-08 LAB
ALBUMIN SERPL-MCNC: 1.6 G/DL (ref 3.4–5)
ALBUMIN/GLOB SERPL: 0.3 (ref 0.8–1.7)
ALP SERPL-CCNC: 144 U/L (ref 45–117)
ALT SERPL-CCNC: 191 U/L (ref 16–61)
AMMONIA PLAS-SCNC: 14 UMOL/L (ref 11–32)
ANION GAP SERPL CALC-SCNC: 7 MMOL/L (ref 3–18)
APPEARANCE UR: ABNORMAL
AST SERPL-CCNC: 71 U/L (ref 10–38)
BACTERIA URNS QL MICRO: ABNORMAL /HPF
BASOPHILS # BLD: 0.1 K/UL (ref 0–0.1)
BASOPHILS NFR BLD: 0 % (ref 0–2)
BILIRUB SERPL-MCNC: 0.4 MG/DL (ref 0.2–1)
BILIRUB UR QL: ABNORMAL
BUN SERPL-MCNC: 8 MG/DL (ref 7–18)
BUN/CREAT SERPL: 9 (ref 12–20)
CALCIUM SERPL-MCNC: 7.6 MG/DL (ref 8.5–10.1)
CHLORIDE SERPL-SCNC: 109 MMOL/L (ref 100–111)
CO2 SERPL-SCNC: 24 MMOL/L (ref 21–32)
COLOR UR: ABNORMAL
CREAT SERPL-MCNC: 0.93 MG/DL (ref 0.6–1.3)
DIFFERENTIAL METHOD BLD: ABNORMAL
EKG ATRIAL RATE: 125 BPM
EKG DIAGNOSIS: NORMAL
EKG P AXIS: 64 DEGREES
EKG P-R INTERVAL: 160 MS
EKG Q-T INTERVAL: 304 MS
EKG QRS DURATION: 80 MS
EKG QTC CALCULATION (BAZETT): 438 MS
EKG R AXIS: -38 DEGREES
EKG T AXIS: 76 DEGREES
EKG VENTRICULAR RATE: 125 BPM
EOSINOPHIL # BLD: 0.1 K/UL (ref 0–0.4)
EOSINOPHIL NFR BLD: 0 % (ref 0–5)
ERYTHROCYTE [DISTWIDTH] IN BLOOD BY AUTOMATED COUNT: 12.6 % (ref 11.6–14.5)
GLOBULIN SER CALC-MCNC: 5.1 G/DL (ref 2–4)
GLUCOSE BLD STRIP.AUTO-MCNC: 307 MG/DL (ref 70–110)
GLUCOSE BLD STRIP.AUTO-MCNC: 351 MG/DL (ref 70–110)
GLUCOSE BLD STRIP.AUTO-MCNC: 395 MG/DL (ref 70–110)
GLUCOSE SERPL-MCNC: 331 MG/DL (ref 74–99)
GLUCOSE UR STRIP.AUTO-MCNC: >1000 MG/DL
HCT VFR BLD AUTO: 34.7 % (ref 36–48)
HGB BLD-MCNC: 11.7 G/DL (ref 13–16)
HGB UR QL STRIP: ABNORMAL
IMM GRANULOCYTES # BLD AUTO: 0.2 K/UL (ref 0–0.04)
IMM GRANULOCYTES NFR BLD AUTO: 1 % (ref 0–0.5)
KETONES UR QL STRIP.AUTO: NEGATIVE MG/DL
LACTATE BLD-SCNC: 1.83 MMOL/L (ref 0.4–2)
LACTATE BLD-SCNC: 2.3 MMOL/L (ref 0.4–2)
LEUKOCYTE ESTERASE UR QL STRIP.AUTO: ABNORMAL
LYMPHOCYTES # BLD: 0.9 K/UL (ref 0.9–3.6)
LYMPHOCYTES NFR BLD: 4 % (ref 21–52)
MAGNESIUM SERPL-MCNC: 1.5 MG/DL (ref 1.6–2.6)
MCH RBC QN AUTO: 28.2 PG (ref 24–34)
MCHC RBC AUTO-ENTMCNC: 33.7 G/DL (ref 31–37)
MCV RBC AUTO: 83.6 FL (ref 78–100)
MONOCYTES # BLD: 1.1 K/UL (ref 0.05–1.2)
MONOCYTES NFR BLD: 6 % (ref 3–10)
NEUTS SEG # BLD: 18.4 K/UL (ref 1.8–8)
NEUTS SEG NFR BLD: 89 % (ref 40–73)
NITRITE UR QL STRIP.AUTO: POSITIVE
NRBC # BLD: 0 K/UL (ref 0–0.01)
NRBC BLD-RTO: 0 PER 100 WBC
PH UR STRIP: 6 (ref 5–8)
PLATELET # BLD AUTO: 429 K/UL (ref 135–420)
PMV BLD AUTO: 9.2 FL (ref 9.2–11.8)
POTASSIUM SERPL-SCNC: 3.2 MMOL/L (ref 3.5–5.5)
PROCALCITONIN SERPL-MCNC: 0.17 NG/ML
PROT SERPL-MCNC: 6.7 G/DL (ref 6.4–8.2)
PROT UR STRIP-MCNC: 100 MG/DL
RBC # BLD AUTO: 4.15 M/UL (ref 4.35–5.65)
RBC #/AREA URNS HPF: ABNORMAL /HPF (ref 0–5)
SODIUM SERPL-SCNC: 140 MMOL/L (ref 136–145)
SP GR UR REFRACTOMETRY: 1.02 (ref 1–1.03)
TROPONIN I SERPL HS-MCNC: 8 NG/L (ref 0–78)
UROBILINOGEN UR QL STRIP.AUTO: 1 EU/DL (ref 0.2–1)
WBC # BLD AUTO: 20.7 K/UL (ref 4.6–13.2)
WBC URNS QL MICRO: ABNORMAL /HPF (ref 0–4)

## 2024-01-08 PROCEDURE — 6360000002 HC RX W HCPCS: Performed by: INTERNAL MEDICINE

## 2024-01-08 PROCEDURE — 83735 ASSAY OF MAGNESIUM: CPT

## 2024-01-08 PROCEDURE — 1100000000 HC RM PRIVATE

## 2024-01-08 PROCEDURE — 85025 COMPLETE CBC W/AUTO DIFF WBC: CPT

## 2024-01-08 PROCEDURE — 96374 THER/PROPH/DIAG INJ IV PUSH: CPT

## 2024-01-08 PROCEDURE — 96361 HYDRATE IV INFUSION ADD-ON: CPT

## 2024-01-08 PROCEDURE — 99223 1ST HOSP IP/OBS HIGH 75: CPT | Performed by: INTERNAL MEDICINE

## 2024-01-08 PROCEDURE — 2580000003 HC RX 258: Performed by: HEALTH CARE PROVIDER

## 2024-01-08 PROCEDURE — 87154 CUL TYP ID BLD PTHGN 6+ TRGT: CPT

## 2024-01-08 PROCEDURE — 87086 URINE CULTURE/COLONY COUNT: CPT

## 2024-01-08 PROCEDURE — 2580000003 HC RX 258: Performed by: INTERNAL MEDICINE

## 2024-01-08 PROCEDURE — 84145 PROCALCITONIN (PCT): CPT

## 2024-01-08 PROCEDURE — 99285 EMERGENCY DEPT VISIT HI MDM: CPT

## 2024-01-08 PROCEDURE — 87077 CULTURE AEROBIC IDENTIFY: CPT

## 2024-01-08 PROCEDURE — 82140 ASSAY OF AMMONIA: CPT

## 2024-01-08 PROCEDURE — 83605 ASSAY OF LACTIC ACID: CPT

## 2024-01-08 PROCEDURE — 81001 URINALYSIS AUTO W/SCOPE: CPT

## 2024-01-08 PROCEDURE — 6370000000 HC RX 637 (ALT 250 FOR IP): Performed by: HEALTH CARE PROVIDER

## 2024-01-08 PROCEDURE — 6360000002 HC RX W HCPCS: Performed by: HEALTH CARE PROVIDER

## 2024-01-08 PROCEDURE — 93005 ELECTROCARDIOGRAM TRACING: CPT | Performed by: INTERNAL MEDICINE

## 2024-01-08 PROCEDURE — 93010 ELECTROCARDIOGRAM REPORT: CPT | Performed by: INTERNAL MEDICINE

## 2024-01-08 PROCEDURE — 71045 X-RAY EXAM CHEST 1 VIEW: CPT

## 2024-01-08 PROCEDURE — 80053 COMPREHEN METABOLIC PANEL: CPT

## 2024-01-08 PROCEDURE — 87040 BLOOD CULTURE FOR BACTERIA: CPT

## 2024-01-08 PROCEDURE — 84484 ASSAY OF TROPONIN QUANT: CPT

## 2024-01-08 PROCEDURE — 6370000000 HC RX 637 (ALT 250 FOR IP): Performed by: INTERNAL MEDICINE

## 2024-01-08 PROCEDURE — 82962 GLUCOSE BLOOD TEST: CPT

## 2024-01-08 PROCEDURE — 87186 SC STD MICRODIL/AGAR DIL: CPT

## 2024-01-08 RX ORDER — 0.9 % SODIUM CHLORIDE 0.9 %
1300 INTRAVENOUS SOLUTION INTRAVENOUS ONCE
Status: COMPLETED | OUTPATIENT
Start: 2024-01-08 | End: 2024-01-08

## 2024-01-08 RX ORDER — DEXTROSE MONOHYDRATE 100 MG/ML
INJECTION, SOLUTION INTRAVENOUS CONTINUOUS PRN
Status: DISCONTINUED | OUTPATIENT
Start: 2024-01-08 | End: 2024-01-12 | Stop reason: HOSPADM

## 2024-01-08 RX ORDER — 0.9 % SODIUM CHLORIDE 0.9 %
1000 INTRAVENOUS SOLUTION INTRAVENOUS ONCE
Status: COMPLETED | OUTPATIENT
Start: 2024-01-08 | End: 2024-01-08

## 2024-01-08 RX ORDER — LANOLIN ALCOHOL/MO/W.PET/CERES
400 CREAM (GRAM) TOPICAL ONCE
Status: COMPLETED | OUTPATIENT
Start: 2024-01-08 | End: 2024-01-08

## 2024-01-08 RX ORDER — POLYETHYLENE GLYCOL 3350 17 G/17G
17 POWDER, FOR SOLUTION ORAL DAILY PRN
Status: DISCONTINUED | OUTPATIENT
Start: 2024-01-08 | End: 2024-01-12 | Stop reason: HOSPADM

## 2024-01-08 RX ORDER — MECOBALAMIN 5000 MCG
5 TABLET,DISINTEGRATING ORAL NIGHTLY PRN
Status: DISCONTINUED | OUTPATIENT
Start: 2024-01-08 | End: 2024-01-12 | Stop reason: HOSPADM

## 2024-01-08 RX ORDER — INSULIN LISPRO 100 [IU]/ML
0-8 INJECTION, SOLUTION INTRAVENOUS; SUBCUTANEOUS
Status: DISCONTINUED | OUTPATIENT
Start: 2024-01-09 | End: 2024-01-12 | Stop reason: HOSPADM

## 2024-01-08 RX ORDER — SODIUM CHLORIDE 9 MG/ML
INJECTION, SOLUTION INTRAVENOUS CONTINUOUS
Status: DISCONTINUED | OUTPATIENT
Start: 2024-01-08 | End: 2024-01-09

## 2024-01-08 RX ORDER — IPRATROPIUM BROMIDE AND ALBUTEROL SULFATE 2.5; .5 MG/3ML; MG/3ML
1 SOLUTION RESPIRATORY (INHALATION) EVERY 4 HOURS PRN
Status: DISCONTINUED | OUTPATIENT
Start: 2024-01-08 | End: 2024-01-12 | Stop reason: HOSPADM

## 2024-01-08 RX ORDER — INSULIN LISPRO 100 [IU]/ML
0-4 INJECTION, SOLUTION INTRAVENOUS; SUBCUTANEOUS
Status: DISCONTINUED | OUTPATIENT
Start: 2024-01-09 | End: 2024-01-08

## 2024-01-08 RX ORDER — 0.9 % SODIUM CHLORIDE 0.9 %
1000 INTRAVENOUS SOLUTION INTRAVENOUS ONCE
Status: DISCONTINUED | OUTPATIENT
Start: 2024-01-08 | End: 2024-01-08

## 2024-01-08 RX ORDER — ACETAMINOPHEN 500 MG
1000 TABLET ORAL ONCE
Status: COMPLETED | OUTPATIENT
Start: 2024-01-08 | End: 2024-01-08

## 2024-01-08 RX ORDER — MAGNESIUM SULFATE IN WATER 40 MG/ML
2000 INJECTION, SOLUTION INTRAVENOUS ONCE
Status: COMPLETED | OUTPATIENT
Start: 2024-01-08 | End: 2024-01-09

## 2024-01-08 RX ORDER — POTASSIUM CHLORIDE 20 MEQ/1
40 TABLET, EXTENDED RELEASE ORAL PRN
Status: DISCONTINUED | OUTPATIENT
Start: 2024-01-08 | End: 2024-01-12 | Stop reason: HOSPADM

## 2024-01-08 RX ORDER — ACETAMINOPHEN 650 MG/1
650 SUPPOSITORY RECTAL EVERY 6 HOURS PRN
Status: DISCONTINUED | OUTPATIENT
Start: 2024-01-08 | End: 2024-01-12 | Stop reason: HOSPADM

## 2024-01-08 RX ORDER — ENOXAPARIN SODIUM 100 MG/ML
40 INJECTION SUBCUTANEOUS DAILY
Status: DISCONTINUED | OUTPATIENT
Start: 2024-01-09 | End: 2024-01-12 | Stop reason: HOSPADM

## 2024-01-08 RX ORDER — ONDANSETRON 2 MG/ML
4 INJECTION INTRAMUSCULAR; INTRAVENOUS EVERY 6 HOURS PRN
Status: DISCONTINUED | OUTPATIENT
Start: 2024-01-08 | End: 2024-01-12 | Stop reason: HOSPADM

## 2024-01-08 RX ORDER — SODIUM CHLORIDE 0.9 % (FLUSH) 0.9 %
5-40 SYRINGE (ML) INJECTION PRN
Status: DISCONTINUED | OUTPATIENT
Start: 2024-01-08 | End: 2024-01-12 | Stop reason: HOSPADM

## 2024-01-08 RX ORDER — ACETAMINOPHEN 325 MG/1
650 TABLET ORAL EVERY 6 HOURS PRN
Status: DISCONTINUED | OUTPATIENT
Start: 2024-01-08 | End: 2024-01-12 | Stop reason: HOSPADM

## 2024-01-08 RX ORDER — MAGNESIUM SULFATE IN WATER 40 MG/ML
2000 INJECTION, SOLUTION INTRAVENOUS PRN
Status: DISCONTINUED | OUTPATIENT
Start: 2024-01-08 | End: 2024-01-12 | Stop reason: HOSPADM

## 2024-01-08 RX ORDER — POTASSIUM CHLORIDE 7.45 MG/ML
10 INJECTION INTRAVENOUS PRN
Status: DISCONTINUED | OUTPATIENT
Start: 2024-01-08 | End: 2024-01-12 | Stop reason: HOSPADM

## 2024-01-08 RX ORDER — SODIUM CHLORIDE 0.9 % (FLUSH) 0.9 %
5-40 SYRINGE (ML) INJECTION EVERY 12 HOURS SCHEDULED
Status: DISCONTINUED | OUTPATIENT
Start: 2024-01-08 | End: 2024-01-12 | Stop reason: HOSPADM

## 2024-01-08 RX ORDER — INSULIN LISPRO 100 [IU]/ML
0-4 INJECTION, SOLUTION INTRAVENOUS; SUBCUTANEOUS NIGHTLY
Status: DISCONTINUED | OUTPATIENT
Start: 2024-01-08 | End: 2024-01-12 | Stop reason: HOSPADM

## 2024-01-08 RX ORDER — SODIUM CHLORIDE 9 MG/ML
INJECTION, SOLUTION INTRAVENOUS PRN
Status: DISCONTINUED | OUTPATIENT
Start: 2024-01-08 | End: 2024-01-12 | Stop reason: HOSPADM

## 2024-01-08 RX ORDER — ONDANSETRON 4 MG/1
4 TABLET, ORALLY DISINTEGRATING ORAL EVERY 8 HOURS PRN
Status: DISCONTINUED | OUTPATIENT
Start: 2024-01-08 | End: 2024-01-12 | Stop reason: HOSPADM

## 2024-01-08 RX ADMIN — SODIUM CHLORIDE: 9 INJECTION, SOLUTION INTRAVENOUS at 23:37

## 2024-01-08 RX ADMIN — SODIUM CHLORIDE, PRESERVATIVE FREE 10 ML: 5 INJECTION INTRAVENOUS at 23:38

## 2024-01-08 RX ADMIN — POTASSIUM BICARBONATE 40 MEQ: 782 TABLET, EFFERVESCENT ORAL at 14:41

## 2024-01-08 RX ADMIN — MAGNESIUM SULFATE HEPTAHYDRATE 2000 MG: 40 INJECTION, SOLUTION INTRAVENOUS at 23:36

## 2024-01-08 RX ADMIN — Medication 400 MG: at 23:27

## 2024-01-08 RX ADMIN — ACETAMINOPHEN 1000 MG: 500 TABLET ORAL at 18:41

## 2024-01-08 RX ADMIN — WATER 1000 MG: 1 INJECTION INTRAMUSCULAR; INTRAVENOUS; SUBCUTANEOUS at 13:06

## 2024-01-08 RX ADMIN — SODIUM CHLORIDE 1300 ML: 9 INJECTION, SOLUTION INTRAVENOUS at 14:41

## 2024-01-08 RX ADMIN — SODIUM CHLORIDE 1000 ML: 9 INJECTION, SOLUTION INTRAVENOUS at 13:06

## 2024-01-08 RX ADMIN — INSULIN LISPRO 4 UNITS: 100 INJECTION, SOLUTION INTRAVENOUS; SUBCUTANEOUS at 23:35

## 2024-01-08 ASSESSMENT — ENCOUNTER SYMPTOMS
VOMITING: 0
COUGH: 0
DIARRHEA: 0
ABDOMINAL PAIN: 0
CHEST TIGHTNESS: 0
SHORTNESS OF BREATH: 0
NAUSEA: 0

## 2024-01-08 ASSESSMENT — PAIN - FUNCTIONAL ASSESSMENT: PAIN_FUNCTIONAL_ASSESSMENT: WONG-BAKER FACES

## 2024-01-08 ASSESSMENT — PAIN SCALES - WONG BAKER
WONGBAKER_NUMERICALRESPONSE: 2
WONGBAKER_NUMERICALRESPONSE: 0

## 2024-01-08 NOTE — ED NOTES
Pt apparently pulled his IV out. Pt's gown and covers were found on floor covered with blood and IV fluids. Pt states he does not want to be admitted. Waiting for pt's wife to return to the ED. Pt was assisted back up onto stretcher.

## 2024-01-08 NOTE — ED NOTES
Called [facility] spoke to Moises provided patient information ETA 9pm    Called [facility] spoke to Daniel. ETA 9pm

## 2024-01-08 NOTE — PROGRESS NOTES
Contacted regarding admission for this patient.  ER provider called and stated the patient was presenting with a UTI and there was concern for sepsis given he met multiple SIRS criteria.  The patient was placed on antibiotics and a urine culture was drawn via straight cath specimen.  The patient will transfer to [facility] for further workup and treatment.  There is concern for memory degradation and the ability for the patient to take care of himself at home.    Patient will remain under the care and management of [facility] Physician at this time until Patient physically arrives at [facility]; advice and recommendations given at this time do not indicate that Hospitalist service will be ordering initial tests or stabilizing treatments (changes in Vitals and Critical Lab Results are neither directly nor emergently reported to [facility] Hospitalist service by Nursing Staff or any other mechanism, but presumably are reported to [facility] Physicians who must manage the Patient).    General Recommendations for Patients include (except where contraindicated): obtaining CBC and CMP daily while in [facility].  Recommend obtaining PT/INR for Patients on Warfarin or who have INR >3.0 in any case.  Recommend that any Patient with an increased FiO2 requirement have Pulse Oximetry ordered.   Recommend that any Patient placed on Cardiac Monitoring have this ordered for the maximum of 72 hours.  Recommend obtaining Vitals at least q8hrs or at the rate for Level of Care Requested, whichever is more frequent.  Recommend H&H be performed 1 hour after final Unit of PRBCs ordered is Transfused to Patient receiving PRBCs.  Recommend that any Patient that is ordered Single-Dose Antibiotics by signed out to be reviewed daily by other [facility] Physicians, as they are still responsible for Patient in [facility] receiving Standards of Care even if Transfer is delayed (please refer to paragraph preceding this one).  Recommend pH (Venous or Arterial) be evaluated when CO2 (i.e.

## 2024-01-08 NOTE — ED NOTES
Straight cathed with spouse at bedside. Changed brief. Pt states that he is feeling much better. ~50 mL urine output.

## 2024-01-08 NOTE — ED NOTES
Pt given ice water. He agrees to wait for his wife to come and discuss his treatment plan and options with her. He removed his IV's before repeat Lactic was drawn. He refuses it at this time.

## 2024-01-08 NOTE — ED PROVIDER NOTES
EMERGENCY DEPARTMENT HISTORY AND PHYSICAL EXAM        Date: 1/8/2024  Patient Name: Mustapha Reardon    History of Presenting Illness     Chief Complaint   Patient presents with    urine problem       History Provided By: History obtained from patient, wife    HPI: Mustapha Reardon, 73 y.o. male presents by car to the ED with cc of foul-smelling urine, weakness, confusion increasing over last 7 days    Patient is alert and oriented although frustrated and somewhat noncompliant with questions.  He states that he is been feeling up and down for a while now, has had decreased appetite but states he ate this morning. He took home meds, but can't say what they are.    Wife accompanies patient in the ED. she states that he has been increasingly forgetful over the last week, he has had general weakness, dark-colored urine.  States that he has a history of urinary tract infection.  She thinks that he does a good job giving himself medications, but states they might be at sporadic times during the day.  He is incontinent at baseline.  She reports that he has an unsteady gait.    Pmh: TIA, hypertension, tachycardia, type 2 diabetes, bipolar manic with psychosis, BPH, incontinence,    No nausea, vomiting, diarrhea, fever, chills, chest pain, shortness of breath, leg swelling     There are no other complaints, changes, or physical findings at this time.    [facility] Reviewed[facility], notes chronic conditions above    [facility] admission September 29, discharged October 1, admission diagnosis TIA, discharge diagnoses cognitive impairment    PCP: Case Salazar MD    No current facility-administered medications on file prior to encounter.     Current Outpatient Medications on File Prior to Encounter   Medication Sig Dispense Refill    naproxen (NAPROSYN) 500 MG tablet Take 1 tablet by mouth 2 times daily as needed for Pain 30 tablet 0    acetaminophen (TYLENOL) 325 MG tablet Take 650 mg by mouth every 4 hours as needed     01/08/24 1900 (!) 131/57 -- (!) 110 -- 96 %   01/08/24 1930 (!) 147/77 -- (!) 106 -- --      Recent Labs     01/08/24  1255   WBC 20.7*   CREATININE 0.93   BILITOT 0.4   *        Severe sepsis identified date: 1/8/24 time: 1232    Fluid Resuscitation Rationale: at least 30mL/kg based on ideal body weight due to obesity defined as BMI >30 (patient's BMI is Body mass index is 26.22 kg/m². and IBW is Ideal body weight: 75.3 kg (166 lb 0.1 oz)  Adjusted ideal body weight: 79.3 kg (174 lb 12.9 oz))    Repeat lactate level:  1.8    Reassessment Exam: I have reassessed tissue perfusion and hemodynamic status after fluid bolus at this date/time: 1/8/24 1803      ED Course:   ED Course as of 01/08/24 2309   Mon Jan 08, 2024   1245 Sinus tachycardia 125 bpm, no ST segment changes or T wave inversion, left axis deviation, narrow QRS [AS]   1446 Dr. Mukherjee accepts patient for admission to medical floor without telemetry monitoring. [AS]   1604 Patient ripped out IV and wants to leave. His wife has been notified by MARLINE Patricio and is on her way to the hospital. [AS]   1732 Patient unable to describe his medical condition and understand consequences of not being treated. I do not feel he has appropriate capacity for medical decision making. His wife states she has medical decision making appointment if the patient is ill, however she does not want the patient to be admitted due to fear of anger and retaliation from the patient. Discussion ongoing at this time between patient and spouse. [AS]   1751 Patient agrees to be admitted and consents to replacement of IV. [AS]   1812 POC Lactic Acid: 1.83  Temperature 102 at this time. Ordered 1g Tylenol. Wife went home, she does not drive at night. [AS]   1937 Transport scheduled for 9 pm BLS to [facility] [AS]   2135 Repeat Temp 98.2 [AS]      ED Course User Index  [AS] Alex Hayes PA-C       1. Hyperglycemia due to diabetes mellitus (HCC)    2. Severe sepsis (HCC)    3.

## 2024-01-09 ENCOUNTER — APPOINTMENT (OUTPATIENT)
Facility: HOSPITAL | Age: 74
End: 2024-01-09
Payer: MEDICARE

## 2024-01-09 PROBLEM — G93.41 ACUTE METABOLIC ENCEPHALOPATHY: Status: ACTIVE | Noted: 2024-01-09

## 2024-01-09 LAB
ACCESSION NUMBER, LLC1M: ABNORMAL
ACINETOBACTER CALCOAC BAUMANNII COMPLEX BY PCR: NOT DETECTED
ALBUMIN SERPL-MCNC: 2 G/DL (ref 3.4–5)
ALBUMIN/GLOB SERPL: 0.4 (ref 0.8–1.7)
ALP SERPL-CCNC: 182 U/L (ref 45–117)
ALT SERPL-CCNC: 230 U/L (ref 16–61)
ANION GAP SERPL CALC-SCNC: 8 MMOL/L (ref 3–18)
AST SERPL-CCNC: 102 U/L (ref 10–38)
B FRAGILIS DNA BLD POS QL NAA+NON-PROBE: NOT DETECTED
BASOPHILS # BLD: 0.1 K/UL (ref 0–0.1)
BASOPHILS NFR BLD: 0 % (ref 0–2)
BILIRUB SERPL-MCNC: 0.8 MG/DL (ref 0.2–1)
BIOFIRE TEST COMMENT: ABNORMAL
BLACTX-M ISLT/SPM QL: NOT DETECTED
BLAIMP ISLT/SPM QL: NOT DETECTED
BLAKPC BLD POS QL NAA+NON-PROBE: NOT DETECTED
BLAOXA-48-LIKE ISLT/SPM QL: NOT DETECTED
BLAVIM ISLT/SPM QL: NOT DETECTED
BUN SERPL-MCNC: 9 MG/DL (ref 7–18)
BUN/CREAT SERPL: 10 (ref 12–20)
C ALBICANS DNA BLD POS QL NAA+NON-PROBE: NOT DETECTED
C AURIS DNA BLD POS QL NAA+NON-PROBE: NOT DETECTED
C GATTII+NEOFOR DNA BLD POS QL NAA+N-PRB: NOT DETECTED
C GLABRATA DNA BLD POS QL NAA+NON-PROBE: NOT DETECTED
C KRUSEI DNA BLD POS QL NAA+NON-PROBE: NOT DETECTED
C PARAP DNA BLD POS QL NAA+NON-PROBE: NOT DETECTED
CALCIUM SERPL-MCNC: 8.8 MG/DL (ref 8.5–10.1)
CHLORIDE SERPL-SCNC: 103 MMOL/L (ref 100–111)
CO2 SERPL-SCNC: 25 MMOL/L (ref 21–32)
COLISTIN RES MCR-1 ISLT/SPM QL: NOT DETECTED
CREAT SERPL-MCNC: 0.87 MG/DL (ref 0.6–1.3)
DIFFERENTIAL METHOD BLD: ABNORMAL
E CLOAC COMP DNA BLD POS NAA+NON-PROBE: NOT DETECTED
E COLI DNA BLD POS QL NAA+NON-PROBE: NOT DETECTED
E FAECALIS DNA BLD POS QL NAA+NON-PROBE: NOT DETECTED
E FAECIUM DNA BLD POS QL NAA+NON-PROBE: NOT DETECTED
ENTEROBACTERALES DNA BLD POS NAA+N-PRB: DETECTED
EOSINOPHIL # BLD: 0.1 K/UL (ref 0–0.4)
EOSINOPHIL NFR BLD: 0 % (ref 0–5)
ERYTHROCYTE [DISTWIDTH] IN BLOOD BY AUTOMATED COUNT: 12.7 % (ref 11.6–14.5)
GLOBULIN SER CALC-MCNC: 4.9 G/DL (ref 2–4)
GLUCOSE BLD STRIP.AUTO-MCNC: 203 MG/DL (ref 70–110)
GLUCOSE BLD STRIP.AUTO-MCNC: 217 MG/DL (ref 70–110)
GLUCOSE BLD STRIP.AUTO-MCNC: 266 MG/DL (ref 70–110)
GLUCOSE BLD STRIP.AUTO-MCNC: 327 MG/DL (ref 70–110)
GLUCOSE SERPL-MCNC: 273 MG/DL (ref 74–99)
GP B STREP DNA BLD POS QL NAA+NON-PROBE: NOT DETECTED
HAEM INFLU DNA BLD POS QL NAA+NON-PROBE: NOT DETECTED
HCT VFR BLD AUTO: 35.3 % (ref 36–48)
HGB BLD-MCNC: 11.5 G/DL (ref 13–16)
IMM GRANULOCYTES # BLD AUTO: 0.2 K/UL (ref 0–0.04)
IMM GRANULOCYTES NFR BLD AUTO: 1 % (ref 0–0.5)
K OXYTOCA DNA BLD POS QL NAA+NON-PROBE: NOT DETECTED
KLEBSIELLA SP DNA BLD POS QL NAA+NON-PRB: DETECTED
KLEBSIELLA SP DNA BLD POS QL NAA+NON-PRB: NOT DETECTED
L MONOCYTOG DNA BLD POS QL NAA+NON-PROBE: NOT DETECTED
LYMPHOCYTES # BLD: 1.4 K/UL (ref 0.9–3.6)
LYMPHOCYTES NFR BLD: 6 % (ref 21–52)
MCH RBC QN AUTO: 27.7 PG (ref 24–34)
MCHC RBC AUTO-ENTMCNC: 32.6 G/DL (ref 31–37)
MCV RBC AUTO: 85.1 FL (ref 78–100)
MONOCYTES # BLD: 1.2 K/UL (ref 0.05–1.2)
MONOCYTES NFR BLD: 5 % (ref 3–10)
N MEN DNA BLD POS QL NAA+NON-PROBE: NOT DETECTED
NEUTS SEG # BLD: 19.7 K/UL (ref 1.8–8)
NEUTS SEG NFR BLD: 87 % (ref 40–73)
NRBC # BLD: 0 K/UL (ref 0–0.01)
NRBC BLD-RTO: 0 PER 100 WBC
P AERUGINOSA DNA BLD POS NAA+NON-PROBE: NOT DETECTED
PLATELET # BLD AUTO: 399 K/UL (ref 135–420)
PMV BLD AUTO: 9.7 FL (ref 9.2–11.8)
POTASSIUM SERPL-SCNC: 4.2 MMOL/L (ref 3.5–5.5)
PROT SERPL-MCNC: 6.9 G/DL (ref 6.4–8.2)
PROTEUS SP DNA BLD POS QL NAA+NON-PROBE: NOT DETECTED
RBC # BLD AUTO: 4.15 M/UL (ref 4.35–5.65)
RESISTANT GENE NDM BY PCR: NOT DETECTED
RESISTANT GENE TARGETS: ABNORMAL
S AUREUS DNA BLD POS QL NAA+NON-PROBE: NOT DETECTED
S AUREUS+CONS DNA BLD POS NAA+NON-PROBE: NOT DETECTED
S EPIDERMIDIS DNA BLD POS QL NAA+NON-PRB: NOT DETECTED
S LUGDUNENSIS DNA BLD POS QL NAA+NON-PRB: NOT DETECTED
S MALTOPHILIA DNA BLD POS QL NAA+NON-PRB: NOT DETECTED
S MARCESCENS DNA BLD POS NAA+NON-PROBE: NOT DETECTED
S PNEUM DNA BLD POS QL NAA+NON-PROBE: NOT DETECTED
S PYO DNA BLD POS QL NAA+NON-PROBE: NOT DETECTED
SALMONELLA DNA BLD POS QL NAA+NON-PROBE: NOT DETECTED
SODIUM SERPL-SCNC: 136 MMOL/L (ref 136–145)
STREPTOCOCCUS DNA BLD POS NAA+NON-PROBE: NOT DETECTED
WBC # BLD AUTO: 22.6 K/UL (ref 4.6–13.2)

## 2024-01-09 PROCEDURE — 85025 COMPLETE CBC W/AUTO DIFF WBC: CPT

## 2024-01-09 PROCEDURE — 2580000003 HC RX 258: Performed by: INTERNAL MEDICINE

## 2024-01-09 PROCEDURE — 2580000003 HC RX 258: Performed by: HEALTH CARE PROVIDER

## 2024-01-09 PROCEDURE — 80053 COMPREHEN METABOLIC PANEL: CPT

## 2024-01-09 PROCEDURE — 74177 CT ABD & PELVIS W/CONTRAST: CPT

## 2024-01-09 PROCEDURE — 6370000000 HC RX 637 (ALT 250 FOR IP): Performed by: INTERNAL MEDICINE

## 2024-01-09 PROCEDURE — 6360000004 HC RX CONTRAST MEDICATION: Performed by: INTERNAL MEDICINE

## 2024-01-09 PROCEDURE — 97161 PT EVAL LOW COMPLEX 20 MIN: CPT

## 2024-01-09 PROCEDURE — 82962 GLUCOSE BLOOD TEST: CPT

## 2024-01-09 PROCEDURE — 6360000002 HC RX W HCPCS: Performed by: INTERNAL MEDICINE

## 2024-01-09 PROCEDURE — 1100000000 HC RM PRIVATE

## 2024-01-09 PROCEDURE — 36415 COLL VENOUS BLD VENIPUNCTURE: CPT

## 2024-01-09 PROCEDURE — 99232 SBSQ HOSP IP/OBS MODERATE 35: CPT | Performed by: INTERNAL MEDICINE

## 2024-01-09 PROCEDURE — 97110 THERAPEUTIC EXERCISES: CPT

## 2024-01-09 PROCEDURE — 6360000002 HC RX W HCPCS: Performed by: HEALTH CARE PROVIDER

## 2024-01-09 RX ORDER — TAMSULOSIN HYDROCHLORIDE 0.4 MG/1
0.4 CAPSULE ORAL DAILY
Status: DISCONTINUED | OUTPATIENT
Start: 2024-01-09 | End: 2024-01-12 | Stop reason: HOSPADM

## 2024-01-09 RX ORDER — LEVOFLOXACIN 5 MG/ML
500 INJECTION, SOLUTION INTRAVENOUS EVERY 24 HOURS
Status: DISCONTINUED | OUTPATIENT
Start: 2024-01-09 | End: 2024-01-12 | Stop reason: HOSPADM

## 2024-01-09 RX ORDER — PANTOPRAZOLE SODIUM 40 MG/1
40 TABLET, DELAYED RELEASE ORAL
Status: DISCONTINUED | OUTPATIENT
Start: 2024-01-09 | End: 2024-01-12 | Stop reason: HOSPADM

## 2024-01-09 RX ORDER — SODIUM CHLORIDE, SODIUM LACTATE, POTASSIUM CHLORIDE, CALCIUM CHLORIDE 600; 310; 30; 20 MG/100ML; MG/100ML; MG/100ML; MG/100ML
INJECTION, SOLUTION INTRAVENOUS CONTINUOUS
Status: DISCONTINUED | OUTPATIENT
Start: 2024-01-09 | End: 2024-01-10

## 2024-01-09 RX ORDER — INSULIN GLARGINE 100 [IU]/ML
7 INJECTION, SOLUTION SUBCUTANEOUS DAILY
Status: DISCONTINUED | OUTPATIENT
Start: 2024-01-09 | End: 2024-01-10

## 2024-01-09 RX ORDER — RISPERIDONE 2 MG/1
6 TABLET ORAL DAILY
Status: DISCONTINUED | OUTPATIENT
Start: 2024-01-09 | End: 2024-01-12 | Stop reason: HOSPADM

## 2024-01-09 RX ORDER — AMLODIPINE BESYLATE 5 MG/1
5 TABLET ORAL DAILY
Status: DISCONTINUED | OUTPATIENT
Start: 2024-01-09 | End: 2024-01-12 | Stop reason: HOSPADM

## 2024-01-09 RX ORDER — ATORVASTATIN CALCIUM 20 MG/1
20 TABLET, FILM COATED ORAL DAILY
Status: DISCONTINUED | OUTPATIENT
Start: 2024-01-09 | End: 2024-01-12 | Stop reason: HOSPADM

## 2024-01-09 RX ORDER — HYDRALAZINE HYDROCHLORIDE 20 MG/ML
5 INJECTION INTRAMUSCULAR; INTRAVENOUS EVERY 6 HOURS PRN
Status: DISCONTINUED | OUTPATIENT
Start: 2024-01-09 | End: 2024-01-12 | Stop reason: HOSPADM

## 2024-01-09 RX ADMIN — TAMSULOSIN HYDROCHLORIDE 0.4 MG: 0.4 CAPSULE ORAL at 08:59

## 2024-01-09 RX ADMIN — PANTOPRAZOLE SODIUM 40 MG: 40 TABLET, DELAYED RELEASE ORAL at 08:59

## 2024-01-09 RX ADMIN — SERTRALINE HYDROCHLORIDE 100 MG: 50 TABLET ORAL at 08:59

## 2024-01-09 RX ADMIN — INSULIN LISPRO 6 UNITS: 100 INJECTION, SOLUTION INTRAVENOUS; SUBCUTANEOUS at 11:35

## 2024-01-09 RX ADMIN — SODIUM CHLORIDE, POTASSIUM CHLORIDE, SODIUM LACTATE AND CALCIUM CHLORIDE: 600; 310; 30; 20 INJECTION, SOLUTION INTRAVENOUS at 13:58

## 2024-01-09 RX ADMIN — SODIUM CHLORIDE, PRESERVATIVE FREE 10 ML: 5 INJECTION INTRAVENOUS at 23:19

## 2024-01-09 RX ADMIN — INSULIN GLARGINE 7 UNITS: 100 INJECTION, SOLUTION SUBCUTANEOUS at 16:38

## 2024-01-09 RX ADMIN — INSULIN LISPRO 4 UNITS: 100 INJECTION, SOLUTION INTRAVENOUS; SUBCUTANEOUS at 09:00

## 2024-01-09 RX ADMIN — LEVOFLOXACIN 500 MG: 500 INJECTION, SOLUTION INTRAVENOUS at 23:18

## 2024-01-09 RX ADMIN — INSULIN LISPRO 2 UNITS: 100 INJECTION, SOLUTION INTRAVENOUS; SUBCUTANEOUS at 16:38

## 2024-01-09 RX ADMIN — ENOXAPARIN SODIUM 40 MG: 100 INJECTION SUBCUTANEOUS at 09:00

## 2024-01-09 RX ADMIN — ATORVASTATIN CALCIUM 20 MG: 20 TABLET, FILM COATED ORAL at 09:00

## 2024-01-09 RX ADMIN — WATER 1000 MG: 1 INJECTION INTRAMUSCULAR; INTRAVENOUS; SUBCUTANEOUS at 13:21

## 2024-01-09 RX ADMIN — IOPAMIDOL 100 ML: 612 INJECTION, SOLUTION INTRAVENOUS at 17:16

## 2024-01-09 RX ADMIN — RISPERIDONE 6 MG: 2 TABLET ORAL at 08:59

## 2024-01-09 RX ADMIN — AMLODIPINE BESYLATE 5 MG: 5 TABLET ORAL at 08:59

## 2024-01-09 ASSESSMENT — PAIN SCALES - GENERAL: PAINLEVEL_OUTOF10: 0

## 2024-01-09 ASSESSMENT — PAIN SCALES - WONG BAKER: WONGBAKER_NUMERICALRESPONSE: 0

## 2024-01-09 NOTE — PLAN OF CARE
Problem: Physical Therapy - Adult  Goal: By Discharge: Performs mobility at highest level of function for planned discharge setting.  See evaluation for individualized goals.  Description: Physical Therapy Goals:  Initiated 1/9/2024 to be met within 7-10 days.    1.  Patient will move from supine to sit and sit to supine  in bed with modified independence.    2.  Patient will transfer from bed to chair and chair to bed with modified independence using the least restrictive device.  3.  Patient will perform sit to stand with modified independence.  4.  Patient will ambulate with supervision/set-up for 100 feet with the least restrictive device.     PLOF: per patient he lives with his wife and ambulates with a cane     Outcome: Progressing   PHYSICAL THERAPY EVALUATION    Patient: Mustapha Reardon (73 y.o. male)  Date: 1/9/2024  Primary Diagnosis: Hypokalemia [E87.6]  Severe sepsis (HCC) [A41.9, R65.20]  Hyperglycemia due to diabetes mellitus (HCC) [E11.65]       Precautions: Fall Risk    ASSESSMENT :  Pt in bed and agreeable to PT evaluation.  Pt performed bed mobility with supervision and standing transfers with SBA/CGA.  Pt ambulated 40 feet with RW and CGA with decreased step length, foot clearance and B knees flexed. Pt performed seated LE exercises at the edge of the bed.  Pt declined sitting up in the recliner and returned to supine with supervision.  Pt was left in bed with needs in reach, nursing notified and bed alarm set.    DEFICITS/IMPAIRMENTS:    , Body Structures, Functions, Activity Limitations Requiring Skilled Therapeutic Intervention: Decreased functional mobility ;Decreased balance;Decreased strength    Patient will benefit from skilled intervention to address the above impairments.  Patient's rehabilitation potential/Therapy Prognosis: Good.  Factors which may influence rehabilitation potential include:   []         None noted  [x]         Mental ability/status  [x]         Medical condition  []

## 2024-01-09 NOTE — H&P
History and Physical    Patient: Mustapha Reardon MRN: [id number]  SSN: [id number]    YOB: 1950  Age: 73 y.o.  Sex: male      Subjective:      Mustapha Reardon is a 73 y.o. male who presents to [facility] ER with complaint of Altered Mental Status.  Patient is A&O x3/4 during Interview and Examination, but was given several opportunities to answer these questions before achieving this final score.  Patient reports that his Wife and Daughter wanted him to come to the Emergency Room, but he reports that he wanted to go to the [facility].  Patient's Review of Systems seems diffusely positive, to include Pain Behind the Eyes when Urinating (i.e. Retrobulbar Pain with Urination), which may mean that Patient is not a reliable Historian at this time.  Patient is not able to provide much other history and frequently pauses and then closes his eyes and requires mild auditory or tactile stimulation to rouse him to participate in Interview or Examination.  Patient endorses fevers, chills, nausea, vomiting, diarrhea, dysuria, cough, pain with inspiration, chest congestion, abdominal pain, flank pain, increased urinary frequency, increased urinary urgency, loss of time, and pain behind the eyes with urination.    In [facility] ER, Patient is noted to have Temperature 102.0°F, Heart Rate 121 bpm, Respiration Rate 28 bpm, Blood Pressure 131/57 mm Hg to 173/111 mm Hg, SpO2 94% on Room Air, WBC 20.7, Hgb 11.7 g/dL, Platelet 429 K, Neut% 89%, Neut# 18.4, Lactic Acid 2.30 mmol/L to 1.83 mmol/L, UA shows Large Hematuria and UTI, Na+ 140 mmol/L, K+ 3.2 mmol/L, BUN 8 mg/dL, Creatinine 0.93 mg/dL (Baseline Creatinine ~0.75-0.95 mg/dL?), eGFR >60, Glucose 351 mg/dL, Albumin 1.6 g/dL, Alk Phos 144 U/L,  U/L, AST 71 U/L, Serum Ammonia 14 umol/L, Troponin 8 ng/L, and Procalcitonin 0.17 ng/mL.  CXR has been read by Radiologist to show \"No active cardiopulmonary disease.\"  Patient received IV Ceftriaxone 1,000 mg, PO

## 2024-01-09 NOTE — PROGRESS NOTES
Advance Care Planning     Advance Care Planning Inpatient Note  Spiritual Care Department    Today's Date: 1/9/2024  Unit: [facility]    Received request from admission screening.  Upon review of chart and communication with care team, patient's decision making abilities are not in question.. Patient was/were present in the room during visit.    Goals of ACP Conversation:  Discuss advance care planning documents    Health Care Decision Makers:       Primary Decision Maker: YgCaron,Rosa - Spouse - [phone]    Secondary Decision Maker: BruceNeena - Child - [phone]  Summary:  Verified Documents  Verified Healthcare Decision Maker      Advance Care Planning Documents (Patient Wishes):  Healthcare Power of /Advance Directive Appointment of Health Care Agent  Living Will/Advance Directive     Assessment:     01/09/24 1524   Encounter Summary   Encounter Overview/Reason  Initial Encounter   Service Provided For: Patient   Referral/Consult From: Rounding   Support System Family members   Last Encounter  01/09/24  (IV-SA-KP)   Complexity of Encounter Moderate   Begin Time 1512   End Time  1520   Total Time Calculated 8 min   Spiritual/Emotional needs   Type Spiritual Support   Advance Care Planning   Type Care Preferences Addressed  (AMD ON File)   Assessment/Intervention/Outcome   Assessment Coping   Intervention Active listening   Outcome Acceptance;Coping   Plan and Referrals   Plan/Referrals Continue to visit, (comment)         Interventions:  Reviewed but did not complete ACP document    Care Preferences Communicated:   No    Outcomes/Plan:  ACP Discussion: Completed    Electronically signed by LUCIO Arredondo on 1/9/2024 at 3:26 PM

## 2024-01-09 NOTE — CARE COORDINATION
Case Management Assessment  Initial Evaluation    Date/Time of Evaluation: 1/9/2024 1:40 PM  Assessment Completed by: MIKI MARX RN    If patient is discharged prior to next notation, then this note serves as note for discharge by case management.    Patient Name: Mustapha Reardon                   YOB: 1950  Diagnosis: Hypokalemia [E87.6]  Severe sepsis (HCC) [A41.9, R65.20]  Hyperglycemia due to diabetes mellitus (HCC) [E11.65]                   Date / Time: 1/8/2024 12:13 PM    Patient Admission Status: Inpatient   Readmission Risk (Low < 19, Mod (19-27), High > 27): Readmission Risk Score: 11.2    Current PCP: Case Saalzar MD  PCP verified by CM? Yes    Chart Reviewed: Yes      History Provided by: Patient, Medical Record  Patient Orientation: Alert and Oriented    Patient Cognition: Alert    Hospitalization in the last 30 days (Readmission):  No    If yes, Readmission Assessment in CM Navigator will be completed.    Advance Directives:      Code Status: Full Code   Patient's Primary Decision Maker is:      Primary Decision Maker: Rosa Alcantara - Spouse - [phone]    Secondary Decision Maker: BruceNeena - Child - [phone]    Discharge Planning:    Patient lives with: (P) Spouse/Significant Other Type of Home: (P) House  Primary Care Giver: Family  Patient Support Systems include: Spouse/Significant Other   Current Financial resources: Medicare, Other (Comment) ( for Life)  Current community resources:    Current services prior to admission: (P) C-pap            Current DME:              Type of Home Care services:  (P) Nursing Services, Aide Services, OT, PT    ADLS  Prior functional level: Assistance with the following:, Cooking, Housework, Shopping  Current functional level: Assistance with the following:, Bathing, Dressing, Toileting, Cooking, Shopping, Housework, Mobility    PT AM-PAC:   /24  OT AM-PAC:   /24    Family can provide assistance at DC:    Would you like  Case Management to discuss the discharge plan with any other family members/significant others, and if so, who? Yes (wife)  Plans to Return to Present Housing: Unknown at present  Other Identified Issues/Barriers to RETURNING to current housing: may need additional assistance in the home.   Potential Assistance needed at discharge: (P) Home Care            Potential DME:    Patient expects to discharge to: (P) House  Plan for transportation at discharge:      Financial    Payor: MEDICARE / Plan: MEDICARE PART A AND B / Product Type: *No Product type* /     Does insurance require precert for SNF: No    Potential assistance Purchasing Medications: (P) No  Meds-to-Beds request:        [id number] - [place], [place] - [address] - P [phone] - F [phone]  [address] [address]  Phone: [phone] Fax: [phone]      Notes:    Factors facilitating achievement of predicted outcomes: Family support, Cooperative, and Pleasant    Barriers to discharge: Confusion, Decreased endurance, Medical complications, and Medication managment    Additional Case Management Notes: attempted to call pts wife Rosa to assist with CM assessment. Cell # on file is wrong # and left message for wife to return call to this CM on home answering machine.    The Plan for Transition of Care is related to the following treatment goals of Hypokalemia [E87.6]  Severe sepsis (HCC) [A41.9, R65.20]  Hyperglycemia due to diabetes mellitus (HCC) [E11.65]    IF APPLICABLE: The Patient and/or patient representative Mustapha and his family were provided with a choice of provider and agrees with the discharge plan. Freedom of choice list with basic dialogue that supports the patient's individualized plan of care/goals and shares the quality data associated with the providers was provided to:     Patient Representative Name:       The Patient and/or Patient Representative Agree with the Discharge Plan?

## 2024-01-09 NOTE — CONSULTS
Infectious Disease Consultation Note        Reason: gram negative bloodstream infection    Current abx Prior abx   Ceftriaxone since 1/8/24      Lines:       Assessment :  73-year-old man with past medical history significant for type 2 diabetes presented to [facility] ED on 1/8/2024 with pain while urinating, bilateral flank pain.     Presentation consistent with sepsis-present on admission due to gram-negative bloodstream infection, hemorrhagic cystitis, bilateral pyelonephritis  Rule out hydronephrosis/obstructive uropathy  Rule out incomplete bladder emptying    Recommendations:    Continue ceftriaxone.  Add levofloxacin  Follow-up ID/susceptibility of gram-negative jesusita in blood culture.  Modify antibiotics accordingly  Obtain CT abdomen/pelvis with contrast to evaluate for hydronephrosis  Obtain postvoid bladder scan every shift for 1 day  Repeat blood cultures tomorrow  Monitor CBC, temperature, clinically  Further recommendations based on above test results, clinical course    Thank you for consultation request. Above plan was discussed in details with patient,  and dr Coronel. Please call me if any further questions or concerns. Will continue to participate in the care of this patient.  HPI:      73-year-old man with past medical history significant for type 2 diabetes presented to [facility] ED on 1/8/2024 with pain while urinating, bilateral flank pain.  Patient is able to answer questions appropriately.  However is not a very good historian.  I obtained most history from review of records, hospitalist.  In the [facility] ER patient was noted to have temperature 102, tachycardia leukocytosis with WBC count 20 K.  Urine analysis revealed hematuria.  Patient was admitted with a diagnosis of severe sepsis secondary to UTI.  Initiated on ceftriaxone.  Blood cultures now positive for gram-negative jesusita.  I have been consulted for further recommendations.    Patient complains of bilateral flank discomfort.  Also had some  Onset: 12 days ago   Location / description: Urine is concentrated   Precipitating Factors: History of dementia, history of prostate problem,  Pain Scale (1-10), 10 highest: 0/10  Associated Symptoms: Poor appetite, poor oral intake, frequency in urination   What improves / worsens symptoms: None   Symptom specific medications: Patient Oxybutynin   Recent visits (last 3-4 weeks) for same reason or recent surgery: No     PLAN:   See/Call Provider within 24 hours    Patient/Caller refuses to follow recommendations.    Reason for Disposition  • Bad or foul-smelling urine    Protocols used: URINARY SYMPTOMS-A-    Caller declined to schedule appointment. Send message to Kemal Muñoz.    [[id number]] Collected: 01/08/24 1232    Order Status: Completed Specimen: Blood Updated: 01/09/24 0934     Special Requests NO SPECIAL REQUESTS        Gram stain       AEROBIC BOTTLE ANAEROBIC BOTTLE Gram negative rods                  SMEAR CALLED TO AND CORRECTLY REPEATED BY: MARLINE SHIPMAN 4RICH ON 09JAN24 AT 0915 HRS TO 4410.           Culture       CULTURE IN PROGRESS,FURTHER UPDATES TO FOLLOW                  Sent to [facility] for ID/Susceptibility if indicated.          Culture, Blood, PCR ID Panel [[id number]] Collected: 01/08/24 1232    Order Status: No result Updated: 01/09/24 0851    Blood Culture 1 [[id number]] Collected: 01/08/24 1220    Order Status: Completed Specimen: Blood Updated: 01/09/24 0917     Special Requests NO SPECIAL REQUESTS        Gram stain       AEROBIC BOTTLE ANAEROBIC BOTTLE Gram negative rods                  SMEAR CALLED TO AND CORRECTLY REPEATED BY: MARLINE ARENAS 4RICH ON 09JAN24 AT 0915 HRS TO 4410.           Culture       CULTURE IN PROGRESS,FURTHER UPDATES TO FOLLOW                  Sent to [facility] for ID/Susceptibility if indicated.                      RADIOLOGY:    All available imaging studies/reports in [facility] for this admission were reviewed      Disclaimer: Sections of this note are dictated utilizing voice recognition software, which may have resulted in some phonetic based errors in grammar and contents. Even though attempts were made to correct all the mistakes, some may have been missed, and remained in the body of the document. If questions arise, please contact our department.    Dr. Anamaria Rain, Infectious Disease Specialist  [phone]  January 9, 2024  9:41 AM

## 2024-01-09 NOTE — PROGRESS NOTES
[facility] Hospitalist Group  Progress Note    Patient: Mustapha Reardon Age: 73 y.o. : 1950 MR#: [id number] SSN: [id number]  Date/Time: 2024    Subjective:     Pt had no specific complaints. Was oriented to self, place and knew what year it wasn't. Had to prompt him a few times to answer questions.    Assessment/Plan:   73 year old male w/ h/o  dm, schizoaffective d/o among other medical conditions admitted after he presented w/ reports of confusion, noted to have sepsis, abnl ua. Was called by nursing the day after admission w/ reports of GNR bacteremia.    -Sepsis (fever, tahycardia, leukocytosis) likely urinary source, given abnl ua  -Suspected UTI  -GNR bacteremia: pcr id panel-->Klebsiella, culture results pending. Discussed w/ ID consultant.     For the above pt on Rocephin, CT a+p ordered, PVR scan ordered by ID    -Acute encephalopathy: likely due to underlying infection.    HISTORY OF:  -DM2: blood sugars elevated, on corrective insulin presently.   -TIA  -HTN: on norvasc    PLAN:  -Will increase rocephin dosing  -CT A+P  -Monitor urine output, bladder scans  -Monitor urine/blood cultures  -Will start lantus    IVF: will change NS to LR, low carb diet, will limit salt,       Additional Notes:      Case discussed with:  [x]Patient  []Family  []Nursing  []Case Management  DVT Prophylaxis:  [x]Lovenox  []Hep SQ  []SCDs  []Coumadin   []On Heparin gtt    Objective:   VS: /72   Pulse (!) 103   Temp 97.8 °F (36.6 °C) (Oral)   Resp 20   Ht 1.803 m (5' 11\")   Wt 85.3 kg (188 lb)   SpO2 95%   BMI 26.22 kg/m²    Tmax/24hrs: Temp (24hrs), Av.1 °F (37.3 °C), Min:97.8 °F (36.6 °C), Max:102 °F (38.9 °C)    Input/Output:   Intake/Output Summary (Last 24 hours) at 2024 1344  Last data filed at 2024 1253  Gross per 24 hour   Intake 1640 ml   Output --   Net 1640 ml       General: alert, awake, in NAD  HEENT: NCAT, sclerae anicteric,  mmm, neck supple  COR: rrr, no

## 2024-01-10 LAB
ANION GAP SERPL CALC-SCNC: 8 MMOL/L (ref 3–18)
BASOPHILS # BLD: 0 K/UL (ref 0–0.1)
BASOPHILS NFR BLD: 0 % (ref 0–2)
BUN SERPL-MCNC: 10 MG/DL (ref 7–18)
BUN/CREAT SERPL: 13 (ref 12–20)
CALCIUM SERPL-MCNC: 9.1 MG/DL (ref 8.5–10.1)
CHLORIDE SERPL-SCNC: 104 MMOL/L (ref 100–111)
CO2 SERPL-SCNC: 24 MMOL/L (ref 21–32)
CREAT SERPL-MCNC: 0.76 MG/DL (ref 0.6–1.3)
DIFFERENTIAL METHOD BLD: ABNORMAL
EOSINOPHIL # BLD: 0.2 K/UL (ref 0–0.4)
EOSINOPHIL NFR BLD: 2 % (ref 0–5)
ERYTHROCYTE [DISTWIDTH] IN BLOOD BY AUTOMATED COUNT: 12.5 % (ref 11.6–14.5)
GLUCOSE BLD STRIP.AUTO-MCNC: 267 MG/DL (ref 70–110)
GLUCOSE BLD STRIP.AUTO-MCNC: 279 MG/DL (ref 70–110)
GLUCOSE BLD STRIP.AUTO-MCNC: 282 MG/DL (ref 70–110)
GLUCOSE BLD STRIP.AUTO-MCNC: 284 MG/DL (ref 70–110)
GLUCOSE SERPL-MCNC: 249 MG/DL (ref 74–99)
HCT VFR BLD AUTO: 32.8 % (ref 36–48)
HGB BLD-MCNC: 10.9 G/DL (ref 13–16)
IMM GRANULOCYTES # BLD AUTO: 0.1 K/UL (ref 0–0.04)
IMM GRANULOCYTES NFR BLD AUTO: 1 % (ref 0–0.5)
LYMPHOCYTES # BLD: 1 K/UL (ref 0.9–3.6)
LYMPHOCYTES NFR BLD: 7 % (ref 21–52)
MAGNESIUM SERPL-MCNC: 1.7 MG/DL (ref 1.6–2.6)
MCH RBC QN AUTO: 27.5 PG (ref 24–34)
MCHC RBC AUTO-ENTMCNC: 33.2 G/DL (ref 31–37)
MCV RBC AUTO: 82.8 FL (ref 78–100)
MONOCYTES # BLD: 1.1 K/UL (ref 0.05–1.2)
MONOCYTES NFR BLD: 8 % (ref 3–10)
NEUTS SEG # BLD: 12.2 K/UL (ref 1.8–8)
NEUTS SEG NFR BLD: 83 % (ref 40–73)
NRBC # BLD: 0 K/UL (ref 0–0.01)
NRBC BLD-RTO: 0 PER 100 WBC
PLATELET # BLD AUTO: 372 K/UL (ref 135–420)
PMV BLD AUTO: 9.7 FL (ref 9.2–11.8)
POTASSIUM SERPL-SCNC: 3.5 MMOL/L (ref 3.5–5.5)
RBC # BLD AUTO: 3.96 M/UL (ref 4.35–5.65)
SODIUM SERPL-SCNC: 136 MMOL/L (ref 136–145)
WBC # BLD AUTO: 14.7 K/UL (ref 4.6–13.2)

## 2024-01-10 PROCEDURE — 2580000003 HC RX 258: Performed by: INTERNAL MEDICINE

## 2024-01-10 PROCEDURE — 97535 SELF CARE MNGMENT TRAINING: CPT

## 2024-01-10 PROCEDURE — 6360000002 HC RX W HCPCS: Performed by: INTERNAL MEDICINE

## 2024-01-10 PROCEDURE — 6370000000 HC RX 637 (ALT 250 FOR IP): Performed by: INTERNAL MEDICINE

## 2024-01-10 PROCEDURE — 36415 COLL VENOUS BLD VENIPUNCTURE: CPT

## 2024-01-10 PROCEDURE — 99232 SBSQ HOSP IP/OBS MODERATE 35: CPT | Performed by: INTERNAL MEDICINE

## 2024-01-10 PROCEDURE — 51798 US URINE CAPACITY MEASURE: CPT

## 2024-01-10 PROCEDURE — 82962 GLUCOSE BLOOD TEST: CPT

## 2024-01-10 PROCEDURE — 80048 BASIC METABOLIC PNL TOTAL CA: CPT

## 2024-01-10 PROCEDURE — 83735 ASSAY OF MAGNESIUM: CPT

## 2024-01-10 PROCEDURE — 97166 OT EVAL MOD COMPLEX 45 MIN: CPT

## 2024-01-10 PROCEDURE — 85025 COMPLETE CBC W/AUTO DIFF WBC: CPT

## 2024-01-10 PROCEDURE — 87040 BLOOD CULTURE FOR BACTERIA: CPT

## 2024-01-10 PROCEDURE — 1100000000 HC RM PRIVATE

## 2024-01-10 RX ORDER — INSULIN GLARGINE 100 [IU]/ML
9 INJECTION, SOLUTION SUBCUTANEOUS DAILY
Status: DISCONTINUED | OUTPATIENT
Start: 2024-01-11 | End: 2024-01-12 | Stop reason: HOSPADM

## 2024-01-10 RX ADMIN — PANTOPRAZOLE SODIUM 40 MG: 40 TABLET, DELAYED RELEASE ORAL at 06:35

## 2024-01-10 RX ADMIN — ATORVASTATIN CALCIUM 20 MG: 20 TABLET, FILM COATED ORAL at 09:11

## 2024-01-10 RX ADMIN — AMLODIPINE BESYLATE 5 MG: 5 TABLET ORAL at 09:11

## 2024-01-10 RX ADMIN — ENOXAPARIN SODIUM 40 MG: 100 INJECTION SUBCUTANEOUS at 09:12

## 2024-01-10 RX ADMIN — SODIUM CHLORIDE, PRESERVATIVE FREE 10 ML: 5 INJECTION INTRAVENOUS at 23:09

## 2024-01-10 RX ADMIN — WATER 2000 MG: 1 INJECTION INTRAMUSCULAR; INTRAVENOUS; SUBCUTANEOUS at 12:30

## 2024-01-10 RX ADMIN — LEVOFLOXACIN 500 MG: 500 INJECTION, SOLUTION INTRAVENOUS at 23:08

## 2024-01-10 RX ADMIN — SERTRALINE HYDROCHLORIDE 100 MG: 50 TABLET ORAL at 09:11

## 2024-01-10 RX ADMIN — INSULIN LISPRO 4 UNITS: 100 INJECTION, SOLUTION INTRAVENOUS; SUBCUTANEOUS at 09:12

## 2024-01-10 RX ADMIN — INSULIN LISPRO 4 UNITS: 100 INJECTION, SOLUTION INTRAVENOUS; SUBCUTANEOUS at 12:29

## 2024-01-10 RX ADMIN — INSULIN GLARGINE 7 UNITS: 100 INJECTION, SOLUTION SUBCUTANEOUS at 09:12

## 2024-01-10 RX ADMIN — TAMSULOSIN HYDROCHLORIDE 0.4 MG: 0.4 CAPSULE ORAL at 09:11

## 2024-01-10 RX ADMIN — SODIUM CHLORIDE, PRESERVATIVE FREE 10 ML: 5 INJECTION INTRAVENOUS at 09:13

## 2024-01-10 RX ADMIN — RISPERIDONE 6 MG: 2 TABLET ORAL at 09:10

## 2024-01-10 RX ADMIN — INSULIN LISPRO 4 UNITS: 100 INJECTION, SOLUTION INTRAVENOUS; SUBCUTANEOUS at 16:39

## 2024-01-10 ASSESSMENT — PAIN SCALES - WONG BAKER
WONGBAKER_NUMERICALRESPONSE: 0

## 2024-01-10 ASSESSMENT — PAIN SCALES - GENERAL
PAINLEVEL_OUTOF10: 0

## 2024-01-10 NOTE — PROGRESS NOTES
OCCUPATIONAL THERAPY EVALUATION/DISCHARGE    Patient: Mustapha Reardon (73 y.o. male)  Date: 1/10/2024  Primary Diagnosis: Hypokalemia [E87.6]  Severe sepsis (HCC) [A41.9, R65.20]  Hyperglycemia due to diabetes mellitus (HCC) [E11.65]       Precautions: General Precautions, Fall Risk,  ,  ,  ,  ,  ,  ,    PLOF: Mod I with ADLs and functional mobility using SPC     ASSESSMENT AND RECOMMENDATIONS:  Bed mobility: Mod I supine <-> sit edge of bed. LB dress: Mod I doff and don slipper socks with good balance using cross leg technique. Toilet transfer:Mod I using RW, pt declined need to void. Pt laying semi-reclined in bed at end of tx session, call bell within reach & pt verbalized understanding to utilize for assist e.g. functional transfers in order to prevent falls.     Maximum therapeutic gains met at current level of care and patient will be discharged from occupational therapy at this time.    Further Equipment Recommendations for Discharge: shower chair and rolling walker    AMPA: AM-PAC Inpatient Daily Activity Raw Score: 23    At this time and based on an AM-PAC score, no further OT is recommended upon discharge due to patient at baseline functional status. Recommend patient returns to prior setting with prior services.    This AMPA score should be considered in conjunction with interdisciplinary team recommendations to determine the most appropriate discharge setting. Patient's social support, diagnosis, medical stability, and prior level of function should also be taken into consideration.     SUBJECTIVE:   Patient stated “I usually use my cane and I also have a walker.”    OBJECTIVE DATA SUMMARY:     Past Medical History:   Diagnosis Date    Diabetes (HCC)     History of cholecystectomy     Hypertension     Schizoaffective disorder (HCC)    History reviewed. No pertinent surgical history.    Home Situation:   Social/Functional History  Lives With: Spouse  Type of Home: House  Home Layout: One level  Home  post-treatment: 0/10     Activity Tolerance:   Activity Tolerance: Patient Tolerated treatment well  Please refer to the flowsheet for vital signs taken during this treatment.    After treatment:   [] Patient left in no apparent distress sitting up in chair  [x] Patient left in no apparent distress in bed  [x] Call bell left within reach  [x] Nursing notified  [] Caregiver present  [x] Bed alarm activated    COMMUNICATION/EDUCATION:   Patient Education  Education Given To: Patient  Education Provided: Role of Therapy;Transfer Training;ADL Adaptive Strategies  Education Method: Verbal;Teach Back  Barriers to Learning: None  Education Outcome: Verbalized understanding    Thank you for this referral.  Kaycee Gonzales, OT  Minutes: 25    Eval Complexity: Decision Making: Medium Complexity

## 2024-01-10 NOTE — PROGRESS NOTES
Infectious Disease progress Note        Reason: gram negative bloodstream infection    Current abx Prior abx   Ceftriaxone since 1/8/24  Levofloxacin since 1/9      Lines:       Assessment :  73-year-old man with past medical history significant for type 2 diabetes presented to [facility] ED on 1/8/2024 with pain while urinating, bilateral flank pain.     Presentation consistent with sepsis-present on admission due to Klebsiella bloodstream infection-positive blood culture 1/8, hemorrhagic cystitis, bilateral pyelonephritis  No evidence of hydronephrosis/obstructive uropathy noted on CT scan 1/9/2024  Rule out incomplete bladder emptying    Clinically better    Recommendations:    Continue ceftriaxone,levofloxacin  Follow-up susceptibility of gram-negative jesusita in blood culture.  Modify antibiotics accordingly  Obtain postvoid bladder scan every shift for 1 day-discussed with RN  Repeat blood cultures today  Monitor CBC, temperature, clinically  Will finalize antibiotics in 1 to 2 days based on above test results, clinical course    Above plan was discussed in details with patient,  and dr Coronel. Please call me if any further questions or concerns. Will continue to participate in the care of this patient.  HPI:    Feels better.  Improved flank pain.  Denies burning while urinating.      Past Medical History:   Diagnosis Date    Diabetes (HCC)     History of cholecystectomy     Hypertension     Schizoaffective disorder (HCC)        History reviewed. No pertinent surgical history.    @[email or web address]@    Current Facility-Administered Medications   Medication Dose Route Frequency    amLODIPine (NORVASC) tablet 5 mg  5 mg Oral Daily    atorvastatin (LIPITOR) tablet 20 mg  20 mg Oral Daily    pantoprazole (PROTONIX) tablet 40 mg  40 mg Oral QAM AC    risperiDONE (RISPERDAL) tablet 6 mg  6 mg Oral Daily    sertraline (ZOLOFT) tablet 100 mg  100 mg Oral Daily    tamsulosin (FLOMAX) capsule 0.4 mg  0.4 mg Oral Daily    hydrALAZINE

## 2024-01-10 NOTE — PROGRESS NOTES
Pt bladder scanned retaining 163ml. Informed patient to call me once urinated for post void scan.

## 2024-01-11 LAB
ANION GAP SERPL CALC-SCNC: 6 MMOL/L (ref 3–18)
BACTERIA SPEC CULT: ABNORMAL
BASOPHILS # BLD: 0 K/UL (ref 0–0.1)
BASOPHILS NFR BLD: 0 % (ref 0–2)
BUN SERPL-MCNC: 9 MG/DL (ref 7–18)
BUN/CREAT SERPL: 11 (ref 12–20)
CALCIUM SERPL-MCNC: 8.8 MG/DL (ref 8.5–10.1)
CC UR VC: ABNORMAL
CHLORIDE SERPL-SCNC: 105 MMOL/L (ref 100–111)
CO2 SERPL-SCNC: 25 MMOL/L (ref 21–32)
CREAT SERPL-MCNC: 0.82 MG/DL (ref 0.6–1.3)
DIFFERENTIAL METHOD BLD: ABNORMAL
EOSINOPHIL # BLD: 0.2 K/UL (ref 0–0.4)
EOSINOPHIL NFR BLD: 1 % (ref 0–5)
ERYTHROCYTE [DISTWIDTH] IN BLOOD BY AUTOMATED COUNT: 12.5 % (ref 11.6–14.5)
GLUCOSE BLD STRIP.AUTO-MCNC: 199 MG/DL (ref 70–110)
GLUCOSE BLD STRIP.AUTO-MCNC: 243 MG/DL (ref 70–110)
GLUCOSE BLD STRIP.AUTO-MCNC: 341 MG/DL (ref 70–110)
GLUCOSE BLD STRIP.AUTO-MCNC: 351 MG/DL (ref 70–110)
GLUCOSE BLD STRIP.AUTO-MCNC: 352 MG/DL (ref 70–110)
GLUCOSE BLD STRIP.AUTO-MCNC: 382 MG/DL (ref 70–110)
GLUCOSE SERPL-MCNC: 253 MG/DL (ref 74–99)
GRAM STN SPEC: ABNORMAL
HCT VFR BLD AUTO: 31.4 % (ref 36–48)
HGB BLD-MCNC: 10.6 G/DL (ref 13–16)
IMM GRANULOCYTES # BLD AUTO: 0.1 K/UL (ref 0–0.04)
IMM GRANULOCYTES NFR BLD AUTO: 1 % (ref 0–0.5)
LYMPHOCYTES # BLD: 1.2 K/UL (ref 0.9–3.6)
LYMPHOCYTES NFR BLD: 8 % (ref 21–52)
MCH RBC QN AUTO: 27.5 PG (ref 24–34)
MCHC RBC AUTO-ENTMCNC: 33.8 G/DL (ref 31–37)
MCV RBC AUTO: 81.6 FL (ref 78–100)
MONOCYTES # BLD: 1 K/UL (ref 0.05–1.2)
MONOCYTES NFR BLD: 7 % (ref 3–10)
NEUTS SEG # BLD: 11.4 K/UL (ref 1.8–8)
NEUTS SEG NFR BLD: 82 % (ref 40–73)
NRBC # BLD: 0 K/UL (ref 0–0.01)
NRBC BLD-RTO: 0 PER 100 WBC
PLATELET # BLD AUTO: 407 K/UL (ref 135–420)
PMV BLD AUTO: 9.3 FL (ref 9.2–11.8)
POTASSIUM SERPL-SCNC: 3.9 MMOL/L (ref 3.5–5.5)
RBC # BLD AUTO: 3.85 M/UL (ref 4.35–5.65)
SERVICE CMNT-IMP: ABNORMAL
SODIUM SERPL-SCNC: 136 MMOL/L (ref 136–145)
WBC # BLD AUTO: 13.9 K/UL (ref 4.6–13.2)

## 2024-01-11 PROCEDURE — 6370000000 HC RX 637 (ALT 250 FOR IP): Performed by: INTERNAL MEDICINE

## 2024-01-11 PROCEDURE — 99232 SBSQ HOSP IP/OBS MODERATE 35: CPT | Performed by: INTERNAL MEDICINE

## 2024-01-11 PROCEDURE — 82962 GLUCOSE BLOOD TEST: CPT

## 2024-01-11 PROCEDURE — 6360000002 HC RX W HCPCS: Performed by: INTERNAL MEDICINE

## 2024-01-11 PROCEDURE — 97116 GAIT TRAINING THERAPY: CPT

## 2024-01-11 PROCEDURE — 36415 COLL VENOUS BLD VENIPUNCTURE: CPT

## 2024-01-11 PROCEDURE — 97110 THERAPEUTIC EXERCISES: CPT

## 2024-01-11 PROCEDURE — 1100000000 HC RM PRIVATE

## 2024-01-11 PROCEDURE — 85025 COMPLETE CBC W/AUTO DIFF WBC: CPT

## 2024-01-11 PROCEDURE — 80048 BASIC METABOLIC PNL TOTAL CA: CPT

## 2024-01-11 PROCEDURE — 2580000003 HC RX 258: Performed by: INTERNAL MEDICINE

## 2024-01-11 RX ADMIN — TAMSULOSIN HYDROCHLORIDE 0.4 MG: 0.4 CAPSULE ORAL at 09:08

## 2024-01-11 RX ADMIN — INSULIN LISPRO 8 UNITS: 100 INJECTION, SOLUTION INTRAVENOUS; SUBCUTANEOUS at 17:27

## 2024-01-11 RX ADMIN — ATORVASTATIN CALCIUM 20 MG: 20 TABLET, FILM COATED ORAL at 09:08

## 2024-01-11 RX ADMIN — AMLODIPINE BESYLATE 5 MG: 5 TABLET ORAL at 09:08

## 2024-01-11 RX ADMIN — SODIUM CHLORIDE, PRESERVATIVE FREE 10 ML: 5 INJECTION INTRAVENOUS at 09:35

## 2024-01-11 RX ADMIN — ENOXAPARIN SODIUM 40 MG: 100 INJECTION SUBCUTANEOUS at 09:07

## 2024-01-11 RX ADMIN — INSULIN LISPRO 2 UNITS: 100 INJECTION, SOLUTION INTRAVENOUS; SUBCUTANEOUS at 09:34

## 2024-01-11 RX ADMIN — ACETAMINOPHEN 325MG 650 MG: 325 TABLET ORAL at 13:46

## 2024-01-11 RX ADMIN — SERTRALINE HYDROCHLORIDE 100 MG: 50 TABLET ORAL at 09:08

## 2024-01-11 RX ADMIN — RISPERIDONE 6 MG: 2 TABLET ORAL at 09:08

## 2024-01-11 RX ADMIN — LEVOFLOXACIN 500 MG: 500 INJECTION, SOLUTION INTRAVENOUS at 21:46

## 2024-01-11 RX ADMIN — PANTOPRAZOLE SODIUM 40 MG: 40 TABLET, DELAYED RELEASE ORAL at 05:53

## 2024-01-11 RX ADMIN — INSULIN LISPRO 6 UNITS: 100 INJECTION, SOLUTION INTRAVENOUS; SUBCUTANEOUS at 13:39

## 2024-01-11 RX ADMIN — SODIUM CHLORIDE, PRESERVATIVE FREE 10 ML: 5 INJECTION INTRAVENOUS at 20:33

## 2024-01-11 RX ADMIN — INSULIN GLARGINE 9 UNITS: 100 INJECTION, SOLUTION SUBCUTANEOUS at 09:07

## 2024-01-11 ASSESSMENT — PAIN SCALES - GENERAL
PAINLEVEL_OUTOF10: 0
PAINLEVEL_OUTOF10: 7

## 2024-01-11 ASSESSMENT — PAIN SCALES - WONG BAKER
WONGBAKER_NUMERICALRESPONSE: 0

## 2024-01-11 ASSESSMENT — PAIN DESCRIPTION - DESCRIPTORS
DESCRIPTORS: ACHING

## 2024-01-11 ASSESSMENT — PAIN DESCRIPTION - DIRECTION
RADIATING_TOWARDS: NO

## 2024-01-11 ASSESSMENT — PAIN DESCRIPTION - FREQUENCY
FREQUENCY: CONTINUOUS

## 2024-01-11 ASSESSMENT — PAIN DESCRIPTION - ORIENTATION
ORIENTATION: OTHER (COMMENT)

## 2024-01-11 ASSESSMENT — PAIN DESCRIPTION - PAIN TYPE
TYPE: ACUTE PAIN

## 2024-01-11 ASSESSMENT — PAIN - FUNCTIONAL ASSESSMENT
PAIN_FUNCTIONAL_ASSESSMENT: ACTIVITIES ARE NOT PREVENTED

## 2024-01-11 ASSESSMENT — PAIN DESCRIPTION - ONSET
ONSET: ON-GOING

## 2024-01-11 ASSESSMENT — PAIN DESCRIPTION - LOCATION
LOCATION: GENERALIZED

## 2024-01-11 NOTE — PROGRESS NOTES
0930:  Attempted to see patient for PT treatment session.  Patient is declining at this time stating that he just got in a fight with his wife and needs time to cool down.  Will re-attempt later in the day.  Jenni Horne, PT    1100: Pt continues to politely decline PT for today.  Will continue to follow.  Jenni Horne, PT

## 2024-01-11 NOTE — PROGRESS NOTES
Infectious Disease progress Note        Reason: gram negative bloodstream infection    Current abx Prior abx   Ceftriaxone since 1/8/24  Levofloxacin since 1/9      Lines:       Assessment :  73-year-old man with past medical history significant for type 2 diabetes presented to [facility] ED on 1/8/2024 with pain while urinating, bilateral flank pain.     Presentation consistent with sepsis-present on admission due to Klebsiella bloodstream infection-positive blood culture 1/8, hemorrhagic cystitis, bilateral pyelonephritis  No evidence of hydronephrosis/obstructive uropathy noted on CT scan 1/9/2024  Postvoid residual bladder scan less than 100 cc    Clinically better    Recommendations:    D/c ceftriaxone, continue levofloxacin  Recommend oral levofloxacin till 1/22/2024  3.  Discharge planning per primary team        Above plan was discussed in details with patient,  and dr Coronel. Please call me if any further questions or concerns. Will continue to participate in the care of this patient.  HPI:    Feels better.  Improved flank pain.  Denies burning while urinating.      Past Medical History:   Diagnosis Date    Diabetes (HCC)     History of cholecystectomy     Hypertension     Schizoaffective disorder (HCC)        History reviewed. No pertinent surgical history.    @[email or web address]@    Current Facility-Administered Medications   Medication Dose Route Frequency    insulin glargine (LANTUS) injection vial 9 Units  9 Units SubCUTAneous Daily    amLODIPine (NORVASC) tablet 5 mg  5 mg Oral Daily    atorvastatin (LIPITOR) tablet 20 mg  20 mg Oral Daily    pantoprazole (PROTONIX) tablet 40 mg  40 mg Oral QAM AC    risperiDONE (RISPERDAL) tablet 6 mg  6 mg Oral Daily    sertraline (ZOLOFT) tablet 100 mg  100 mg Oral Daily    tamsulosin (FLOMAX) capsule 0.4 mg  0.4 mg Oral Daily    hydrALAZINE (APRESOLINE) injection 5 mg  5 mg IntraVENous Q6H PRN    levoFLOXacin (LEVAQUIN) 500 MG/100ML infusion 500 mg  500 mg IntraVENous Q24H  maltophilia by PCR Not detected        Candida albicans by PCR Not detected        Candida auris by PCR Not detected        Candida glabrata Not detected        Candida krusei by PCR Not detected        Candida parapsilosis by PCR Not detected        Cryptococcus neoformans/gattii by PCR Not detected        Resistant gene targets          Resistant gene ctx-m by PCR Not detected        Resistant gene imp by PCR Not detected        KPC (Carbapenem resistance gene) Not detected        Colistin Resistance mcr-1 gene by PCR Not detected        Resistant gene ndm by PCR Not detected        Resistant gene oxa-48-like by pcr Not detected        Resistant gene vim by PCR Not detected        [employer] test comment       False positive results may rarely occur. Correlate with clinical,epidemiologic, and other laboratory findings           Comment: Please see BCID Interpretation Guide in EPIC Links       Blood Culture 1 [[id number]]  (Abnormal) Collected: 01/08/24 1220    Order Status: Completed Specimen: Blood Updated: 01/09/24 1431     Special Requests NO SPECIAL REQUESTS        Gram stain       AEROBIC BOTTLE ANAEROBIC BOTTLE Gram negative rods                  SMEAR CALLED TO AND CORRECTLY REPEATED BY: MARLINE ARENAS 4NORT ON 09JAN24 AT 0915 HRS TO 4410.           Culture       Gram negative rods GROWING IN BOTH BOTTLES DRAWN SITE = [facility]                      RADIOLOGY:    All available imaging studies/reports in [facility] for this admission were reviewed    High complexity decision making was performed during the evaluation of this patient at high risk for decompensation with multiple organ involvement     Above mentioned total time spent on reviewing the case/medical record/data/notes/EMR/patient examination/documentation/coordinating care with nurse/consultants, exclusive of procedures with complex decision making performed and > 50% time spent in face to face evaluation.      Disclaimer: Sections of this note are dictated

## 2024-01-11 NOTE — PROGRESS NOTES
[facility] Hospitalist Group  Progress Note    Patient: Mustapha Reardon Age: 73 y.o. : 1950 MR#: [id number] SSN: [id number]  Date/Time: 1/10/2024    Subjective:     Pt w/ complaints of poor appetite.    Assessment/Plan:   73 year old male w/ h/o  dm, schizoaffective d/o among other medical conditions admitted after he presented w/ reports of confusion, noted to have sepsis, abnl ua. Was called by nursing the day after admission w/ reports of GNR bacteremia.    -Sepsis (fever, tahycardia, leukocytosis) likely urinary source, given abnl ua  -Suspected UTI, CT a+p --> findings c/w bilateral acute pyelo  -GNR bacteremia: pcr id panel-->Klebsiella,  final culture results pending.   - No evidence of urinary retention, bladder scan 163 cc urine, post void scan 56 cc.     For the above pt on Rocephin and levofloxacin, ordered    -Acute encephalopathy: likely due to underlying infection, seems to have cleared up.    HISTORY OF:  -DM2: blood sugars elevated, on corrective insulin and low dose lantus presently.   -TIA  -HTN: on norvasc    PLAN:  -Abx per ID  -Dc ivf  -Start nutrition supplement for reported poor appetite  -Monitor urine output, bladder scans  -Monitor urine/blood cultures  -Will increase lantus    IVF: will dc IVF, cont low carb diet, will limit salt,       Additional Notes:      Case discussed with:  [x]Patient  []Family  []Nursing  []Case Management  DVT Prophylaxis:  [x]Lovenox  []Hep SQ  []SCDs  []Coumadin   []On Heparin gtt    Objective:   VS: BP (!) 152/83   Pulse 91   Temp (!) 100.5 °F (38.1 °C) (Oral)   Resp 18   Ht 1.803 m (5' 11\")   Wt 85.3 kg (188 lb)   SpO2 98%   BMI 26.22 kg/m²    Tmax/24hrs: Temp (24hrs), Av °F (37.2 °C), Min:98.4 °F (36.9 °C), Max:100.5 °F (38.1 °C)    Input/Output:   Intake/Output Summary (Last 24 hours) at 1/10/2024 2243  Last data filed at 1/10/2024 1831  Gross per 24 hour   Intake 480 ml   Output 56 ml   Net 424 ml       General:

## 2024-01-12 VITALS
WEIGHT: 188 LBS | OXYGEN SATURATION: 96 % | HEIGHT: 71 IN | TEMPERATURE: 98.4 F | BODY MASS INDEX: 26.32 KG/M2 | RESPIRATION RATE: 20 BRPM | SYSTOLIC BLOOD PRESSURE: 115 MMHG | DIASTOLIC BLOOD PRESSURE: 79 MMHG | HEART RATE: 95 BPM

## 2024-01-12 PROBLEM — E11.65 HYPERGLYCEMIA DUE TO DIABETES MELLITUS (HCC): Status: ACTIVE | Noted: 2024-01-12

## 2024-01-12 PROBLEM — E43 SEVERE PROTEIN-CALORIE MALNUTRITION (HCC): Status: ACTIVE | Noted: 2024-01-12

## 2024-01-12 LAB
ANION GAP SERPL CALC-SCNC: 6 MMOL/L (ref 3–18)
BASOPHILS # BLD: 0.1 K/UL (ref 0–0.1)
BASOPHILS NFR BLD: 0 % (ref 0–2)
BUN SERPL-MCNC: 10 MG/DL (ref 7–18)
BUN/CREAT SERPL: 14 (ref 12–20)
CALCIUM SERPL-MCNC: 9.2 MG/DL (ref 8.5–10.1)
CHLORIDE SERPL-SCNC: 105 MMOL/L (ref 100–111)
CO2 SERPL-SCNC: 25 MMOL/L (ref 21–32)
CREAT SERPL-MCNC: 0.73 MG/DL (ref 0.6–1.3)
DIFFERENTIAL METHOD BLD: ABNORMAL
EOSINOPHIL # BLD: 0.4 K/UL (ref 0–0.4)
EOSINOPHIL NFR BLD: 3 % (ref 0–5)
ERYTHROCYTE [DISTWIDTH] IN BLOOD BY AUTOMATED COUNT: 12.6 % (ref 11.6–14.5)
GLUCOSE BLD STRIP.AUTO-MCNC: 236 MG/DL (ref 70–110)
GLUCOSE BLD STRIP.AUTO-MCNC: 280 MG/DL (ref 70–110)
GLUCOSE SERPL-MCNC: 185 MG/DL (ref 74–99)
HCT VFR BLD AUTO: 32 % (ref 36–48)
HGB BLD-MCNC: 10.7 G/DL (ref 13–16)
IMM GRANULOCYTES # BLD AUTO: 0.1 K/UL (ref 0–0.04)
IMM GRANULOCYTES NFR BLD AUTO: 1 % (ref 0–0.5)
LYMPHOCYTES # BLD: 1.4 K/UL (ref 0.9–3.6)
LYMPHOCYTES NFR BLD: 11 % (ref 21–52)
MCH RBC QN AUTO: 27.5 PG (ref 24–34)
MCHC RBC AUTO-ENTMCNC: 33.4 G/DL (ref 31–37)
MCV RBC AUTO: 82.3 FL (ref 78–100)
MONOCYTES # BLD: 0.8 K/UL (ref 0.05–1.2)
MONOCYTES NFR BLD: 7 % (ref 3–10)
NEUTS SEG # BLD: 9.7 K/UL (ref 1.8–8)
NEUTS SEG NFR BLD: 78 % (ref 40–73)
NRBC # BLD: 0 K/UL (ref 0–0.01)
NRBC BLD-RTO: 0 PER 100 WBC
PLATELET # BLD AUTO: 384 K/UL (ref 135–420)
PMV BLD AUTO: 9.7 FL (ref 9.2–11.8)
POTASSIUM SERPL-SCNC: 3.7 MMOL/L (ref 3.5–5.5)
RBC # BLD AUTO: 3.89 M/UL (ref 4.35–5.65)
SODIUM SERPL-SCNC: 136 MMOL/L (ref 136–145)
WBC # BLD AUTO: 12.4 K/UL (ref 4.6–13.2)

## 2024-01-12 PROCEDURE — 80048 BASIC METABOLIC PNL TOTAL CA: CPT

## 2024-01-12 PROCEDURE — 85025 COMPLETE CBC W/AUTO DIFF WBC: CPT

## 2024-01-12 PROCEDURE — 6370000000 HC RX 637 (ALT 250 FOR IP): Performed by: HOSPITALIST

## 2024-01-12 PROCEDURE — 6360000002 HC RX W HCPCS: Performed by: INTERNAL MEDICINE

## 2024-01-12 PROCEDURE — 2580000003 HC RX 258: Performed by: INTERNAL MEDICINE

## 2024-01-12 PROCEDURE — 6370000000 HC RX 637 (ALT 250 FOR IP): Performed by: INTERNAL MEDICINE

## 2024-01-12 PROCEDURE — 36415 COLL VENOUS BLD VENIPUNCTURE: CPT

## 2024-01-12 PROCEDURE — 97110 THERAPEUTIC EXERCISES: CPT

## 2024-01-12 PROCEDURE — 82962 GLUCOSE BLOOD TEST: CPT

## 2024-01-12 PROCEDURE — 97116 GAIT TRAINING THERAPY: CPT

## 2024-01-12 RX ORDER — MULTIVITAMIN WITH IRON
1 TABLET ORAL DAILY
Status: DISCONTINUED | OUTPATIENT
Start: 2024-01-12 | End: 2024-01-12 | Stop reason: HOSPADM

## 2024-01-12 RX ORDER — LEVOFLOXACIN 500 MG/1
500 TABLET, FILM COATED ORAL DAILY
Qty: 10 TABLET | Refills: 0 | Status: SHIPPED | OUTPATIENT
Start: 2024-01-12 | End: 2024-01-22

## 2024-01-12 RX ORDER — L. ACIDOPHILUS,CASEI,RHAMNOSUS 50B CELL
1 CAPSULE,DELAYED RELEASE (ENTERIC COATED) ORAL
Qty: 14 CAPSULE | Refills: 0 | Status: SHIPPED | OUTPATIENT
Start: 2024-01-12 | End: 2024-01-26

## 2024-01-12 RX ADMIN — PANTOPRAZOLE SODIUM 40 MG: 40 TABLET, DELAYED RELEASE ORAL at 06:28

## 2024-01-12 RX ADMIN — AMLODIPINE BESYLATE 5 MG: 5 TABLET ORAL at 09:26

## 2024-01-12 RX ADMIN — ATORVASTATIN CALCIUM 20 MG: 20 TABLET, FILM COATED ORAL at 09:24

## 2024-01-12 RX ADMIN — INSULIN LISPRO 4 UNITS: 100 INJECTION, SOLUTION INTRAVENOUS; SUBCUTANEOUS at 12:09

## 2024-01-12 RX ADMIN — INSULIN GLARGINE 9 UNITS: 100 INJECTION, SOLUTION SUBCUTANEOUS at 09:23

## 2024-01-12 RX ADMIN — SODIUM CHLORIDE, PRESERVATIVE FREE 10 ML: 5 INJECTION INTRAVENOUS at 12:09

## 2024-01-12 RX ADMIN — INSULIN LISPRO 2 UNITS: 100 INJECTION, SOLUTION INTRAVENOUS; SUBCUTANEOUS at 09:27

## 2024-01-12 RX ADMIN — ENOXAPARIN SODIUM 40 MG: 100 INJECTION SUBCUTANEOUS at 09:26

## 2024-01-12 RX ADMIN — RISPERIDONE 6 MG: 2 TABLET ORAL at 09:24

## 2024-01-12 RX ADMIN — SERTRALINE HYDROCHLORIDE 100 MG: 50 TABLET ORAL at 09:25

## 2024-01-12 RX ADMIN — THERA TABS 1 TABLET: TAB at 14:57

## 2024-01-12 RX ADMIN — TAMSULOSIN HYDROCHLORIDE 0.4 MG: 0.4 CAPSULE ORAL at 09:24

## 2024-01-12 ASSESSMENT — PAIN SCALES - GENERAL
PAINLEVEL_OUTOF10: 0
PAINLEVEL_OUTOF10: 0

## 2024-01-12 NOTE — PROGRESS NOTES
[facility] Hospitalist Group  Progress Note    Patient: Mustapha Reardon Age: 73 y.o. : 1950 MR#: [id number] SSN: [id number]  Date/Time: 2024    Subjective:     Pt had c/o his interaction w/ PT/OT, not clearly stating the specifics. States he is to weak to go home.     Assessment/Plan:   73 year old male w/ h/o  dm, schizoaffective d/o among other medical conditions admitted after he presented w/ reports of confusion, noted to have sepsis, abnl ua. Was called by nursing the day after admission w/ reports of GNR bacteremia.    -Sepsis (fever, tahycardia, leukocytosis) likely urinary source, given abnl ua: resolving. Last temp spike on 1/10 at 20:00  -UTI, CT a+p --> findings c/w bilateral acute pyelo  -GNR bacteremia: pcr id panel-->Klebsiella,  final culture results pending.   - No evidence of urinary retention, bladder scan 163 cc urine, post void scan 56 cc.     For the above Rocephin stopped by ID he continues on levofloxacin, end date for levofloxacin 24.    -Acute encephalopathy: likely due to underlying infection, seems to have cleared up.    HISTORY OF:  -DM2: blood sugars elevated, on corrective insulin and low dose lantus presently.   -TIA  -HTN: on norvasc    PLAN:  -levofloxacin until 24  -Started nutrition supplement for reported poor appetite  -Monitor urine output, bladder scans  -increased lantus, blood sugars still elevated, consider increasing lantus in the near future if bs still elevated    Dispo: Likely tomorrow, had temp spike last evening. Pt states he is to weak to go home. Declining to work w/ PT today. Wants to go home tomorrow.       Additional Notes:      Case discussed with:  [x]Patient  []Family  []Nursing  []Case Management  DVT Prophylaxis:  [x]Lovenox  []Hep SQ  []SCDs  []Coumadin   []On Heparin gtt    Objective:   VS: /76   Pulse 86   Temp 98.5 °F (36.9 °C) (Oral)   Resp 18   Ht 1.803 m (5' 11\")   Wt 85.3 kg (188 lb)   SpO2 98%

## 2024-01-12 NOTE — PROGRESS NOTES
Discharge teaching completed at bedside with patient and wife. Opportunity provided for clarifying questions. All answered to patient satisfaction. IV removed. ID removed and shredded. Patient discharged via wheelchair.

## 2024-01-12 NOTE — PLAN OF CARE
Problem: Physical Therapy - Adult  Goal: By Discharge: Performs mobility at highest level of function for planned discharge setting.  See evaluation for individualized goals.  Description: Physical Therapy Goals:  Initiated 1/9/2024 to be met within 7-10 days.    1.  Patient will move from supine to sit and sit to supine  in bed with modified independence.    2.  Patient will transfer from bed to chair and chair to bed with modified independence using the least restrictive device.  3.  Patient will perform sit to stand with modified independence.  4.  Patient will ambulate with supervision/set-up for 100 feet with the least restrictive device.     PLOF: per patient he lives with his wife and ambulates with a cane     1/12/2024 0913 by Jenni Horne, PT  Outcome: Progressing   Discharge instructions given to patient and spouse Rosa. PIV removed and catheter intact. Pt was stable at the time of discharge.

## 2024-01-12 NOTE — DISCHARGE INSTRUCTIONS
overnight or 5 pounds in a week, increased swelling in our hands or feet or shortness of breath while lying flat in bed.  Please call your doctor as soon as you notice any of these symptoms; do not wait until your next office visit.  Patient armband removed and shredded   Thank you for enrolling in [employer]. Please follow the instructions below to securely access your online medical record. [employer] allows you to send messages to your doctor, view your test results, renew your prescriptions, schedule appointments, and more.     How Do I Sign Up?  In your Internet browser, go to [email or web address]  Click on the Sign Up Now link in the Sign In box. You will see the New Member Sign Up page.  Enter your [employer] Access Code exactly as it appears below. You will not need to use this code after you’ve completed the sign-up process. If you do not sign up before the expiration date, you must request a new code.  [employer] Access Code: [email or web address]  Expires: 2/22/2024  1:14 PM    Enter your Social Security Number ([id number]) and Date of Birth (mm/dd/yyyy) as indicated and click Submit. You will be taken to the next sign-up page.  Create a [employer] ID. This will be your [employer] login ID and cannot be changed, so think of one that is secure and easy to remember.  Create a [employer] password. You can change your password at any time.  Enter your Password Reset Question and Answer. This can be used at a later time if you forget your password.   Enter your e-mail address. You will receive e-mail notification when new information is available in [employer].  Click Sign Up. You can now view your medical record.     Additional Information  If you have questions, please contact your physician practice where you receive care. Remember, [employer] is NOT to be used for urgent needs. For medical emergencies, dial 911.     The discharge information has been reviewed with the {PATIENT PARENT GUARDIAN:78067}.  The {PATIENT  PARENT GUARDIAN:35475} verbalized understanding.  Discharge medications reviewed with the {Harshad meds reviewed with:[id number]} and appropriate educational materials and side effects teaching were provided.  ___________________________________________________________________________________________________________________________________

## 2024-01-12 NOTE — CARE COORDINATION
Caroline Holt [facility] and will work on setting up [facility]    Nat requested orders be faxed to her directly to speed up the process    Faxed to [phone]    Update: [facility]  received [facility] and will set up

## 2024-01-12 NOTE — CARE COORDINATION
[facility] with [facility]    [facility] health orders faxed to [facility] center to [phone] and [phone]    [email or web address]    MSW left [facility] with [facility] social Caroline Holt     Patient already has a walker and shower chair at home    Wife to transport

## 2024-01-12 NOTE — CONSULTS
Comprehensive Nutrition Assessment    Type and Reason for Visit:  Initial, Positive Nutrition Screen, Consult, Patient Education    Nutrition Recommendations/Plan:   Modify supplement orders to  Glucerna (each provides 220 kcal, 10g protein) TID  Continue current diet. Encourage PO intake.   Plan to add MVI r/t malnutrition and poor meal intake PTA.   Monitor PO intake/tolerance of meals/supplements, weight, labs, and POC while admitted.      Malnutrition Assessment:  Malnutrition Status:  Severe malnutrition (01/12/24 1350)    Context:  Chronic Illness     Findings of the 6 clinical characteristics of malnutrition:  Energy Intake:  75% or less estimated energy requirements for 1 month or longer  Weight Loss:  Greater than 20% over 1 year     Body Fat Loss:  No significant body fat loss Orbital, Buccal region, Triceps   Muscle Mass Loss:  No significant muscle mass loss Temples (temporalis), Clavicles (pectoralis & deltoids)  Fluid Accumulation:  No significant fluid accumulation     Strength:  Not Performed    Nutrition History and Allergies:   PMHx: T2DM, cholecystectomy, HTN, schizoaffective d/o. Wt hx: c wt-  185.2 lb (bed scale), 215 lb 9/30/23, -13.9%), significant wt loss x 4 months, per limited EMR review. NKFA.     Nutrition Assessment:    Pt admitted for management of UTI. MST noted: unsure of weight loss, no decreased appetite. Consult noted for diet education, diabetic diet. Received call from Dr. Terry regarding pt d/c today and to provide education. Visited pt- agreeable to education, though noted wife does most cooking and shopping. Discussed basics of sources of carbohydrates vs without, portion sizes, nutrition facts label reading, and meal planning. Obtained bed scale reading of 185.2 lb. Pt noted usual wt of 245 lbs >8 months PTA, representing loss of 24.4% in <1 year, significant. Reported skipping breakfast, having a small snack for lunch usually, and often eating out for dinners. Discussed

## 2024-01-12 NOTE — DISCHARGE SUMMARY
Discharge Summary    Patient: Mustapha Reardon               Sex: male          DOA: 1/8/2024         YOB: 1950      Age:  73 y.o.        LOS:  LOS: 4 days                Admit Date: 1/8/2024    Discharge Date: 1/12/2024    Admission Diagnoses: Hypokalemia [E87.6]  Severe sepsis (HCC) [A41.9, R65.20]  Hyperglycemia due to diabetes mellitus (HCC) [E11.65]    Reason for admission:    73-year-old man with past medical history significant for type 2 diabetes presented to [facility] ED on 1/8/2024 with pain while urinating, bilateral flank pain.     Discharge Condition:  good    Hospital Course:  73 year old male w/ h/o dm, schizoaffective d/o among other medical conditions admitted after he presented w/ reports of confusion, noted to have sepsis, abnl ua. In the [facility] ER patient was noted to have temperature 102, tachycardia leukocytosis with WBC count 20 K. Urine analysis revealed hematuria. Patient was admitted with a diagnosis of severe sepsis secondary to UTI. Initiated on ceftriaxone. Transferred to [facility] for further management.     -Sepsis (fever, tahycardia, leukocytosis) likely urinary source, given abnl ua: resolving. Last temp spike on 1/10 at 20:00  -UTI, CT a+p --> findings c/w bilateral acute pyelo  -GNR bacteremia: pcr id panel-->Klebsiella,  final culture results pending.   - No evidence of urinary retention, bladder scan 163 cc urine, post void scan 56 cc.      For the above Rocephin stopped by ID he continues on levofloxacin, end date for levofloxacin 1/22/24.     -Acute encephalopathy: likely due to underlying infection, seems to have cleared up.     -DM2: blood sugars elevated, on corrective insulin and low dose lantus presently.   -TIA  -Hypertension resume norvasc      Consults:    Treatment Team: Attending Provider: Adry Terry MD; Consulting Physician: Anamaria Rain MD; Registered Nurse: Jessica Hussein RN; Physical Therapist: Jenni Horne, PT    Procedures none    Significant

## 2024-01-12 NOTE — PLAN OF CARE
Problem: Physical Therapy - Adult  Goal: By Discharge: Performs mobility at highest level of function for planned discharge setting.  See evaluation for individualized goals.  Description: Physical Therapy Goals:  Initiated 1/9/2024 to be met within 7-10 days.    1.  Patient will move from supine to sit and sit to supine  in bed with modified independence.    2.  Patient will transfer from bed to chair and chair to bed with modified independence using the least restrictive device.  3.  Patient will perform sit to stand with modified independence.  4.  Patient will ambulate with supervision/set-up for 100 feet with the least restrictive device.     PLOF: per patient he lives with his wife and ambulates with a cane     Outcome: Progressing   PHYSICAL THERAPY TREATMENT    Patient: Mustapha Reardon (73 y.o. male)  Date: 1/12/2024  Diagnosis: Hypokalemia [E87.6]  Severe sepsis (HCC) [A41.9, R65.20]  Hyperglycemia due to diabetes mellitus (HCC) [E11.65] Acute UTI      Precautions: General Precautions, Fall Risk,    ASSESSMENT:  Pt in bed and agreeable to PT treatment.  Pt verbalized feeling in a better mood compared to this morning and is motivated to improve his strength.  Pt was mod I with bed mobility and mod I/supervision with sit to stand transfer.  Pt ambulated 60 feet with RW and SBA with decreased step length, foot clearance and slow mica.  After a seated rest break in the recliner patient performed LE exercises as noted below.  Pt was left sitting in the recliner with needs in reach and nursing notified.    Progression toward goals:   [x]      Improving appropriately and progressing toward goals  []      Improving slowly and progressing toward goals  []      Not making progress toward goals and plan of care will be adjusted     PLAN:  Patient continues to benefit from skilled intervention to address the above impairments.  Continue treatment per established plan of care.    Further Equipment Recommendations for

## 2024-01-14 LAB
BACTERIA SPEC CULT: NORMAL
SERVICE CMNT-IMP: NORMAL

## 2024-01-15 NOTE — CARE COORDINATION
Received a call from Herlinda from [facility] stating the case was opened in [facility] and she has received the faxed referral information, but the case was not booked in [facility].  Booked the case in [facility] so that they can visit the patient.

## 2024-01-16 LAB
BACTERIA SPEC CULT: NORMAL
SERVICE CMNT-IMP: NORMAL

## 2024-10-25 ENCOUNTER — HOSPITAL ENCOUNTER (EMERGENCY)
Facility: HOSPITAL | Age: 74
Discharge: HOME | End: 2024-10-25
Payer: MEDICARE

## 2024-10-25 ENCOUNTER — APPOINTMENT (OUTPATIENT)
Facility: HOSPITAL | Age: 74
End: 2024-10-25
Payer: MEDICARE

## 2024-10-25 VITALS
OXYGEN SATURATION: 97 % | WEIGHT: 200 LBS | HEART RATE: 100 BPM | HEIGHT: 71 IN | TEMPERATURE: 98.3 F | RESPIRATION RATE: 20 BRPM | BODY MASS INDEX: 28 KG/M2 | SYSTOLIC BLOOD PRESSURE: 148 MMHG | DIASTOLIC BLOOD PRESSURE: 90 MMHG

## 2024-10-25 DIAGNOSIS — Z11.52 ENCOUNTER FOR SCREENING FOR COVID-19: ICD-10-CM

## 2024-10-25 DIAGNOSIS — J30.2 SEASONAL ALLERGIES: ICD-10-CM

## 2024-10-25 DIAGNOSIS — R09.82 POST-NASAL DRIP: ICD-10-CM

## 2024-10-25 DIAGNOSIS — A59.03 TRICHOMONAL URETHRITIS: Primary | ICD-10-CM

## 2024-10-25 LAB
ALBUMIN SERPL-MCNC: 2.6 G/DL (ref 3.4–5)
ALBUMIN/GLOB SERPL: 0.5 (ref 0.8–1.7)
ALP SERPL-CCNC: 86 U/L (ref 45–117)
ALT SERPL-CCNC: 45 U/L (ref 16–61)
ANION GAP SERPL CALC-SCNC: 10 MMOL/L (ref 3–18)
APPEARANCE UR: CLEAR
AST SERPL-CCNC: 45 U/L (ref 10–38)
BACTERIA URNS QL MICRO: NEGATIVE /HPF
BASOPHILS # BLD: 0 K/UL (ref 0–0.1)
BASOPHILS NFR BLD: 0 % (ref 0–2)
BILIRUB SERPL-MCNC: 0.5 MG/DL (ref 0.2–1)
BILIRUB UR QL: NEGATIVE
BUN SERPL-MCNC: 10 MG/DL (ref 7–18)
BUN/CREAT SERPL: 11 (ref 12–20)
CALCIUM SERPL-MCNC: 8.8 MG/DL (ref 8.5–10.1)
CHLORIDE SERPL-SCNC: 106 MMOL/L (ref 100–111)
CO2 SERPL-SCNC: 23 MMOL/L (ref 21–32)
COLOR UR: ABNORMAL
CREAT SERPL-MCNC: 0.88 MG/DL (ref 0.6–1.3)
DIFFERENTIAL METHOD BLD: ABNORMAL
EOSINOPHIL # BLD: 0.1 K/UL (ref 0–0.4)
EOSINOPHIL NFR BLD: 1 % (ref 0–5)
EPITH CASTS URNS QL MICRO: ABNORMAL /LPF (ref 0–5)
ERYTHROCYTE [DISTWIDTH] IN BLOOD BY AUTOMATED COUNT: 12.2 % (ref 11.6–14.5)
FLUAV RNA SPEC QL NAA+PROBE: NOT DETECTED
FLUBV RNA SPEC QL NAA+PROBE: NOT DETECTED
GLOBULIN SER CALC-MCNC: 5 G/DL (ref 2–4)
GLUCOSE SERPL-MCNC: 115 MG/DL (ref 74–99)
GLUCOSE UR STRIP.AUTO-MCNC: NEGATIVE MG/DL
HCT VFR BLD AUTO: 35.7 % (ref 36–48)
HGB BLD-MCNC: 12 G/DL (ref 13–16)
HGB UR QL STRIP: NEGATIVE
IMM GRANULOCYTES # BLD AUTO: 0.2 K/UL (ref 0–0.04)
IMM GRANULOCYTES NFR BLD AUTO: 1 % (ref 0–0.5)
KETONES UR QL STRIP.AUTO: NEGATIVE MG/DL
LEUKOCYTE ESTERASE UR QL STRIP.AUTO: ABNORMAL
LYMPHOCYTES # BLD: 1.6 K/UL (ref 0.9–3.6)
LYMPHOCYTES NFR BLD: 12 % (ref 21–52)
MCH RBC QN AUTO: 27.1 PG (ref 24–34)
MCHC RBC AUTO-ENTMCNC: 33.6 G/DL (ref 31–37)
MCV RBC AUTO: 80.8 FL (ref 78–100)
MONOCYTES # BLD: 1.2 K/UL (ref 0.05–1.2)
MONOCYTES NFR BLD: 9 % (ref 3–10)
NEUTS SEG # BLD: 11.1 K/UL (ref 1.8–8)
NEUTS SEG NFR BLD: 78 % (ref 40–73)
NITRITE UR QL STRIP.AUTO: NEGATIVE
NRBC # BLD: 0 K/UL (ref 0–0.01)
NRBC BLD-RTO: 0 PER 100 WBC
PH UR STRIP: 5.5 (ref 5–8)
PLATELET # BLD AUTO: 368 K/UL (ref 135–420)
PMV BLD AUTO: 8.9 FL (ref 9.2–11.8)
POTASSIUM SERPL-SCNC: 4.8 MMOL/L (ref 3.5–5.5)
PROT SERPL-MCNC: 7.6 G/DL (ref 6.4–8.2)
PROT UR STRIP-MCNC: ABNORMAL MG/DL
RBC # BLD AUTO: 4.42 M/UL (ref 4.35–5.65)
RBC #/AREA URNS HPF: ABNORMAL /HPF (ref 0–5)
S PYO DNA THROAT QL NAA+PROBE: NOT DETECTED
SARS-COV-2 RNA RESP QL NAA+PROBE: NOT DETECTED
SODIUM SERPL-SCNC: 139 MMOL/L (ref 136–145)
SOURCE: NORMAL
SP GR UR REFRACTOMETRY: 1.02 (ref 1–1.03)
TRICHOMONAS UR QL MICRO: ABNORMAL
UROBILINOGEN UR QL STRIP.AUTO: 1 EU/DL (ref 0.2–1)
WBC # BLD AUTO: 14.2 K/UL (ref 4.6–13.2)
WBC URNS QL MICRO: ABNORMAL /HPF (ref 0–4)

## 2024-10-25 PROCEDURE — 99284 EMERGENCY DEPT VISIT MOD MDM: CPT

## 2024-10-25 PROCEDURE — 87651 STREP A DNA AMP PROBE: CPT

## 2024-10-25 PROCEDURE — 71046 X-RAY EXAM CHEST 2 VIEWS: CPT

## 2024-10-25 PROCEDURE — 81001 URINALYSIS AUTO W/SCOPE: CPT

## 2024-10-25 PROCEDURE — 87636 SARSCOV2 & INF A&B AMP PRB: CPT

## 2024-10-25 PROCEDURE — 87086 URINE CULTURE/COLONY COUNT: CPT

## 2024-10-25 PROCEDURE — 80053 COMPREHEN METABOLIC PANEL: CPT

## 2024-10-25 PROCEDURE — 85025 COMPLETE CBC W/AUTO DIFF WBC: CPT

## 2024-10-25 RX ORDER — METRONIDAZOLE 500 MG/1
2000 TABLET ORAL ONCE
Qty: 4 TABLET | Refills: 0 | Status: SHIPPED | OUTPATIENT
Start: 2024-10-25 | End: 2024-10-25

## 2024-10-25 RX ORDER — FLUTICASONE PROPIONATE 50 MCG
2 SPRAY, SUSPENSION (ML) NASAL DAILY
Qty: 16 G | Refills: 0 | Status: SHIPPED | OUTPATIENT
Start: 2024-10-25

## 2024-10-25 RX ORDER — CETIRIZINE HYDROCHLORIDE 10 MG/1
10 TABLET ORAL DAILY
Qty: 30 TABLET | Refills: 0 | Status: SHIPPED | OUTPATIENT
Start: 2024-10-25

## 2024-10-25 ASSESSMENT — ENCOUNTER SYMPTOMS
BACK PAIN: 0
SINUS PAIN: 0
DIARRHEA: 0
ABDOMINAL PAIN: 0
SINUS PRESSURE: 0
CONSTIPATION: 0
RHINORRHEA: 1
SHORTNESS OF BREATH: 0
WHEEZING: 0
EYE DISCHARGE: 0
VOMITING: 0
EYE REDNESS: 0
COUGH: 1
NAUSEA: 0
SORE THROAT: 1

## 2024-10-25 ASSESSMENT — PAIN SCALES - GENERAL: PAINLEVEL_OUTOF10: 7

## 2024-10-25 ASSESSMENT — PAIN - FUNCTIONAL ASSESSMENT: PAIN_FUNCTIONAL_ASSESSMENT: 0-10

## 2024-10-25 NOTE — ED PROVIDER NOTES
[facility] EMERGENCY DEPT  EMERGENCY DEPARTMENT ENCOUNTER      Pt Name: Mustapha Reardon  Pt Age: 74 y.o.  Sex: male   MRN: [id number]  CSN: [id number]   Birthdate 1950  Date of evaluation: 10/25/2024  Provider: GERARDO HALE  Room: [facility]05/05  Time Dictated: 5:29 PM    Chief Complaint   Chief Complaint   Patient presents with    Generalized Body Aches    possible UTI       History of Present Illness   The history is provided by the patient. No  was used.       3:15 PM  74 y.o. male presents to the ED C/O a wet cough, a post-nasal drip, a sore throat, and body aches for the last month now. In the past when he had these symptoms it was a UTI, so he wants his urine tested also. Patient reports that he has been taking a daily antihistamine and Flonase with no relief. No fever, chills, SOB, difficulty breathing, wheezing, back pain, chest pain, leg edema, PND, [place], fatigue, headaches, known sick contacts, or weakness.     Nursing Note reviewed    REVIEW OF SYSTEMS    (2-9 systems for level 4, 10 or more for level 5)   Review of Systems   Constitutional:  Negative for chills and fever.   HENT:  Positive for congestion, rhinorrhea and sore throat. Negative for ear pain, sinus pressure, sinus pain and sneezing.    Eyes:  Negative for discharge and redness.   Respiratory:  Positive for cough. Negative for shortness of breath and wheezing.    Cardiovascular:  Negative for chest pain and palpitations.   Gastrointestinal:  Negative for abdominal pain, constipation, diarrhea, nausea and vomiting.   Genitourinary:  Negative for dysuria, frequency and urgency.   Musculoskeletal:  Positive for myalgias. Negative for back pain and neck pain.   Skin:  Negative for rash and wound.   Neurological:  Negative for dizziness and headaches.   Hematological:  Negative for adenopathy.   Psychiatric/Behavioral:  Negative for agitation and confusion.        PAST MEDICAL HISTORY     Past Medical History:   Diagnosis Date     tablet  Commonly known as: GLUCOPHAGE     omeprazole 20 MG delayed release capsule  Commonly known as: PRILOSEC     risperiDONE 3 MG tablet  Commonly known as: RISPERDAL     sertraline 100 MG tablet  Commonly known as: ZOLOFT     tamsulosin 0.4 MG capsule  Commonly known as: FLOMAX               Where to Get Your Medications        These medications were sent to [id number] - [place], [place] - [address] - P [phone] - F [phone]  [address] [address]      Phone: [phone]   cetirizine 10 MG tablet  fluticasone 50 MCG/ACT nasal spray  metroNIDAZOLE 500 MG tablet          PATIENT REFERRED TO:  Case Salazar MD  [address] [place] [id number] [phone]    Go in 1 week  As needed if no improvement      DISCHARGE MEDICATIONS:  New Prescriptions    CETIRIZINE (ZYRTEC) 10 MG TABLET    Take 1 tablet by mouth daily    FLUTICASONE (FLONASE) 50 MCG/ACT NASAL SPRAY    2 sprays by Each Nostril route daily    METRONIDAZOLE (FLAGYL) 500 MG TABLET    Take 4 tablets by mouth once for 1 dose     Controlled Substances Monitoring:          No data to display                Nursing notes have been reviewed by the physician/advanced practice    Clinician.    Renetta Canales PA-C  October 25, 2024         Renetta Canales PA  10/25/24 1723       Renetta Canales PA  10/25/24 1724

## 2024-10-25 NOTE — ED TRIAGE NOTES
Pt states he hasn't felt well for a month. C/o body aches. States has had UTIs in the past. Pt also c/o raspy and dry throat, states he has to flush the phlegm out

## 2024-10-25 NOTE — DISCHARGE INSTRUCTIONS
Drink a lot of water. Continue your antihistamines and Flonase nasal spray daily. Take medication prescribed. F/U with your PCP next week for re-evaluation if no improvement.

## 2024-10-27 LAB
BACTERIA SPEC CULT: NORMAL
SERVICE CMNT-IMP: NORMAL